# Patient Record
Sex: MALE | Race: WHITE | Employment: OTHER | URBAN - METROPOLITAN AREA
[De-identification: names, ages, dates, MRNs, and addresses within clinical notes are randomized per-mention and may not be internally consistent; named-entity substitution may affect disease eponyms.]

---

## 2017-01-01 ENCOUNTER — ALLIED HEALTH/NURSE VISIT (OUTPATIENT)
Dept: FAMILY MEDICINE | Facility: CLINIC | Age: 82
End: 2017-01-01
Payer: COMMERCIAL

## 2017-01-01 DIAGNOSIS — Z23 NEED FOR PROPHYLACTIC VACCINATION AND INOCULATION AGAINST INFLUENZA: Primary | ICD-10-CM

## 2017-01-01 PROCEDURE — 90662 IIV NO PRSV INCREASED AG IM: CPT

## 2017-01-01 PROCEDURE — 99207 ZZC NO CHARGE NURSE ONLY: CPT

## 2017-01-01 PROCEDURE — G0008 ADMIN INFLUENZA VIRUS VAC: HCPCS

## 2017-01-02 DIAGNOSIS — K57.32 DIVERTICULITIS OF COLON: Primary | ICD-10-CM

## 2017-01-02 DIAGNOSIS — I10 ESSENTIAL HYPERTENSION WITH GOAL BLOOD PRESSURE LESS THAN 140/90: ICD-10-CM

## 2017-01-02 RX ORDER — CIPROFLOXACIN 500 MG/1
500 TABLET, FILM COATED ORAL 2 TIMES DAILY
Qty: 20 TABLET | Refills: 0 | Status: CANCELLED | OUTPATIENT
Start: 2017-01-02

## 2017-01-02 RX ORDER — METOPROLOL SUCCINATE 25 MG/1
12.5 TABLET, EXTENDED RELEASE ORAL
Qty: 30 TABLET | Refills: 0 | Status: SHIPPED | OUTPATIENT
Start: 2017-01-02 | End: 2017-01-03

## 2017-01-02 NOTE — TELEPHONE ENCOUNTER
Metoprolol      Last Written Prescription Date: Historical  Last Fill Quantity: , # refills:     Last Office Visit with [facility] [place] or [facility] prescribing provider:  12/21/16   Future Office Visit:   0     BP Readings from Last 3 Encounters:   12/21/16 138/72   10/18/16 139/69   10/14/16 151/71       Coprofloxacin      Last Written Prescription Date: 10/26/16  Last Fill Quantity: 20,  # refills: 0   Last Office Visit with [facility] [place] or [facility] prescribing provider: 12/21/16

## 2017-01-03 DIAGNOSIS — I10 ESSENTIAL HYPERTENSION WITH GOAL BLOOD PRESSURE LESS THAN 140/90: Primary | ICD-10-CM

## 2017-01-03 RX ORDER — METOPROLOL SUCCINATE 25 MG/1
12.5 TABLET, EXTENDED RELEASE ORAL
Qty: 90 TABLET | Refills: 1 | Status: SHIPPED | OUTPATIENT
Start: 2017-01-03 | End: 2017-01-11

## 2017-01-03 NOTE — TELEPHONE ENCOUNTER
Anibal is wondering if his Metoprolol Rx can be changed to 90 day Rx for his mail order.  It was sent yesterday as #30

## 2017-01-05 ENCOUNTER — TELEPHONE (OUTPATIENT)
Dept: FAMILY MEDICINE | Facility: CLINIC | Age: 82
End: 2017-01-05

## 2017-01-05 NOTE — TELEPHONE ENCOUNTER
Reason for Call:  Home Health Care    Jolene with [facility] called regarding (reason for call): Jolene says she has a concern. Anibal had surgery for a neoplasm on his left lower lobe. He says he has lost 24# . He has no nausea or vomiting and is eating small meals.  His current weight reported by Anibal is 222#  She wonders if she should be concerned or if he should just continue to monitor    Pt Provider:Froilan    Phone Number Homecare Nurse can be reached at: [phone]    Can we leave a detailed message on this number? YES    Best Time: anytime    Call taken on 1/5/2017 at 11:06 AM by Sheryl Cunningham

## 2017-01-06 ENCOUNTER — ALLIED HEALTH/NURSE VISIT (OUTPATIENT)
Dept: FAMILY MEDICINE | Facility: CLINIC | Age: 82
End: 2017-01-06
Payer: COMMERCIAL

## 2017-01-06 DIAGNOSIS — C34.92 ADENOCARCINOMA, LUNG, LEFT (H): Primary | ICD-10-CM

## 2017-01-06 PROCEDURE — 99207 ZZC NO CHARGE NURSE ONLY: CPT

## 2017-01-06 NOTE — PROGRESS NOTES
S-(situation): homecare nurse stopped into clinic to discuss Anibal home status    B-(background): no nausea, vomiting, diarrhea or fever.  Weight has gone from 246 # to 222 # in the last two weeks.      A-(assessment): weight loss    R-(recommendations): advised the nurse to have him make an appointment.  He has talked with another RN this morning as well and she will call him to set up appointment with Dr Mcgovern.  Tish Medina RN

## 2017-01-06 NOTE — TELEPHONE ENCOUNTER
S-(situation): Patient has been losing weight since his surgery in December.      B-(background): Patient has history of lung adenocarcinoma with mets.  Patient had left thoracotomy with pleural biopsies and left lower lobe wedge.    A-(assessment): Patient and nurse are concerned about the patient losing weight since his recent surgery in December.  Patient reports that he has lost about 30#.  Patient denies any nausea, vomiting, diarrhea or fevers.  Patient has been eating normal with lots of protein.  Patient states he has normal bowel movements and urination.  Patient does have some SOB with going up a flight of stairs, but denies  SOB while walking or any other time.  Patient does daily weights at the same time everyday.  Patient has also developed bilateral hand shakiness for the past 2 weeks.     R-(recommendations): Advised patient to be seen in clinic.  Scheduled for 1-9-17 with PCP.  Patient was advised if symptoms worsen or change over the weekend to go to UC/[facility] immediately.  Patient agrees with the plan and understands.    Isabel GARCIA RN

## 2017-01-09 ENCOUNTER — OFFICE VISIT (OUTPATIENT)
Dept: FAMILY MEDICINE | Facility: CLINIC | Age: 82
End: 2017-01-09
Payer: COMMERCIAL

## 2017-01-09 VITALS
RESPIRATION RATE: 20 BRPM | WEIGHT: 231 LBS | SYSTOLIC BLOOD PRESSURE: 120 MMHG | TEMPERATURE: 97.2 F | DIASTOLIC BLOOD PRESSURE: 70 MMHG | HEIGHT: 69 IN | HEART RATE: 92 BPM | BODY MASS INDEX: 34.21 KG/M2

## 2017-01-09 DIAGNOSIS — R63.4 WEIGHT LOSS: Primary | ICD-10-CM

## 2017-01-09 DIAGNOSIS — L30.9 DERMATITIS: ICD-10-CM

## 2017-01-09 DIAGNOSIS — C34.92 ADENOCARCINOMA, LUNG, LEFT (H): ICD-10-CM

## 2017-01-09 PROCEDURE — 99213 OFFICE O/P EST LOW 20 MIN: CPT | Performed by: FAMILY MEDICINE

## 2017-01-09 RX ORDER — TRIAMCINOLONE ACETONIDE 1 MG/G
CREAM TOPICAL 2 TIMES DAILY
Qty: 60 G | Refills: 1 | Status: SHIPPED | OUTPATIENT
Start: 2017-01-09 | End: 2018-01-01

## 2017-01-09 NOTE — PATIENT INSTRUCTIONS
ASSESSMENT:   (R63.4) Weight loss  (primary encounter diagnosis)  Comment: Uncertain cause.  We do not know of extensive cancer spread.  No depression.  NO change in medications.   Plan: REcheck in a month after PET scan.     (L30.9) Dermatitis  Comment: legs  Plan: triamcinolone (KENALOG) 0.1 % cream  For dry skin:  Limit bathing to every other or every third day if possible.  Limit water and chemical exposure.  Use mild soap for bathing like Ivory or Neutragena, and limit areas the soap is used for example groin and armpits.    Frequent use of moisturizing lotion or cream is important. There are lotions which are thin and work in quickly like Vaseline Intensive care or Tammy lotion.  Creams are a litte thicker like Yolanda.  Some skin lubricants hold in moisture even better like Eucerin or Aquafor but these are also greasier.   Topical steroid medication can be applied to irritated areas twice daily.    1% hydrocortisone cream is mild and available over the counter.    There are stronger cortisone creams available medium and very potent preparations.     Longer tem use (weeks or months) of topical cortisone/steroid medications can cause skin atrophy or lighter skin color.  The more potent the preparation, the more risk for damaging the skin.    You can try the triamcinolone 0.1% cream twice daily for the itchy skin on legs.       (C34.92) Adenocarcinoma, lung, left (H)  Comment: followed by cancer specialist at [facility], PET scan in a month.

## 2017-01-09 NOTE — NURSING NOTE
"Chief Complaint   Patient presents with     Weight Loss     Can he have bees taken off his allergies?     Derm Problem     Check skin on ankles, itchy area since cold weather.     /70 mmHg  Pulse 92  Temp(Src) 97.2  F (36.2  C) (Tympanic)  Resp 20  Ht 5' 9\" (1.753 m)  Wt 231 lb (104.781 kg)  BMI 34.10 kg/m2 Estimated body mass index is 34.1 kg/(m^2) as calculated from the following:    Height as of this encounter: 5' 9\" (1.753 m).    Weight as of this encounter: 231 lb (104.781 kg).  bp completed using cuff size: regular      Health Maintenance that is potentially due pending provider review:          "

## 2017-01-09 NOTE — PROGRESS NOTES
SUBJECTIVE:                                                    Anibal Bhardwaj is a 84 year old male who presents to clinic today for the following health issues:  Chief Complaint   Patient presents with     Weight Loss     Can he have bees taken off his allergies?     Derm Problem     Check skin on ankles, itchy area since cold weather.      Last clinic visit: 12/21 for follow-up hospital stay for pulmonary nodules and wedge resection with biopsies.    He has seen Hematology/Oncology.  No treatment recommended so far.   He is scheduled for a PET scan in early February.  This will also be done at [facility].    No treatment offered at this time until PET scan is back.     Problem 1:   Loss of weight      Duration: one month    Description (location/character/radiation): Loss of weight.    Intensity:  moderate  Accompanying signs and symptoms:Patient has also developed bilateral hand shakiness for the past 2 weeks.     History (similar episodes/previous evaluation): Cancer    Precipitating or alleviating factors: None    Therapies tried and outcome: none   He has been losing about a pound a day.   Wt Readings from Last 5 Encounters:   01/09/17 231 lb (104.781 kg)   12/21/16 234 lb (106.142 kg)   10/18/16 244 lb 6.4 oz (110.859 kg)   10/06/16 242 lb (109.77 kg)   06/17/16 246 lb (111.585 kg)     11/9/2015:do=825  Diet:not eating as much.  Not as hungry.  Not as active.  Appetite:seems to get full easily.  No difficulty swallowing.  No heartburn.  No nausea or vomiting. Bowels unchanged.     Problem 2:   derm      Duration: months    Description (location/character/radiation): itchy areas on bilateral ankles and back of legs    Intensity:  mild, moderate, severe    Accompanying signs and symptoms: itchy skin no fever or chills    History (similar episodes/previous evaluation): ?    Precipitating or alleviating factors: ? Heat from blankets makes it worse    Therapies tried and outcome: Lotion   Has had rash each  "winter.  Gets tiny pimples which are itchy.     Patient Active Problem List   Diagnosis     Allergic rhinitis     History of colonic polyps     Osteoarthrosis, unspecified whether generalized or localized, involving lower leg     Obesity     Gouty arthropathy     Hypertrophy of prostate without urinary obstruction     HYPERLIPIDEMIA LDL GOAL <130     Seborrheic keratosis     Lentigo     Angioma     Dermal nevus     Essential hypertension with goal blood pressure less than 140/90     LBBB (left bundle branch block)     Infection of prosthetic knee joint (H)     CKD (chronic kidney disease) stage 3, GFR 30-59 ml/min     Diabetes mellitus, type 2 (H)     Adenocarcinoma, lung, left (H)      ROS:General: POSITIVE for:, weight loss, energy has decreased.  Resp: No coughing, wheezing or shortness of breath  CV: No chest pains or palpitations  GI: see above   : No urinary frequency or dysuria, bladder or kidney problems  Musculoskeletal: pain left anterior chest.   Psychiatric: No problems with anxiety, depression or mental health  Feels like band at left lower anterior ribs.     OBJECTIVE:Blood pressure 120/70, pulse 92, temperature 97.2  F (36.2  C), temperature source Tympanic, resp. rate 20, height 5' 9\" (1.753 m), weight 231 lb (104.781 kg). BMI=Body mass index is 34.1 kg/(m^2).  GENERAL APPEARANCE ADULT: Alert, no acute distress, obese  NECK: No adenopathy,masses or thyromegaly  RESP: lungs clear to auscultation   CV: normal rate, regular rhythm, no murmur or gallop  ABDOMEN: soft, no organomegaly, masses or tenderness  MS: extremities normal, no peripheral edema  Some tenderness around incision left lateral chest wall.   SKIN: Some dry skin on lower legs.      ASSESSMENT:   (R63.4) Weight loss  (primary encounter diagnosis)  Comment: Uncertain cause.  We do not know of extensive cancer spread.  No depression.  NO change in medications.   Plan: REcheck in a month after PET scan.     (L30.9) Dermatitis  Comment: " legs  Plan: triamcinolone (KENALOG) 0.1 % cream  For dry skin:  Limit bathing to every other or every third day if possible.  Limit water and chemical exposure.  Use mild soap for bathing like Ivory or Neutragena, and limit areas the soap is used for example groin and armpits.    Frequent use of moisturizing lotion or cream is important. There are lotions which are thin and work in quickly like Vaseline Intensive care or Tammy lotion.  Creams are a litte thicker like Yolanda.  Some skin lubricants hold in moisture even better like Eucerin or Aquafor but these are also greasier.   Topical steroid medication can be applied to irritated areas twice daily.    1% hydrocortisone cream is mild and available over the counter.    There are stronger cortisone creams available medium and very potent preparations.     Longer tem use (weeks or months) of topical cortisone/steroid medications can cause skin atrophy or lighter skin color.  The more potent the preparation, the more risk for damaging the skin.    You can try the triamcinolone 0.1% cream twice daily for the itchy skin on legs.       (C34.92) Adenocarcinoma, lung, left (H)  Comment: followed by cancer specialist at [facility], PET scan in a month.

## 2017-01-09 NOTE — MR AVS SNAPSHOT
After Visit Summary   1/9/2017    Anibal Bhardwaj    MRN: [id number]           Patient Information     Date Of Birth          9/18/1932        Visit Information        Provider Department      1/9/2017 2:40 PM Michele Mcgovern MD [facility]        Today's Diagnoses     Weight loss    -  1     Dermatitis         Adenocarcinoma, lung, left (H)           Care Instructions    ASSESSMENT:   (R63.4) Weight loss  (primary encounter diagnosis)  Comment: Uncertain cause.  We do not know of extensive cancer spread.  No depression.  NO change in medications.   Plan: REcheck in a month after PET scan.     (L30.9) Dermatitis  Comment: legs  Plan: triamcinolone (KENALOG) 0.1 % cream  For dry skin:  Limit bathing to every other or every third day if possible.  Limit water and chemical exposure.  Use mild soap for bathing like Ivory or Neutragena, and limit areas the soap is used for example groin and armpits.    Frequent use of moisturizing lotion or cream is important. There are lotions which are thin and work in quickly like Vaseline Intensive care or Tammy lotion.  Creams are a litte thicker like Yolanda.  Some skin lubricants hold in moisture even better like Eucerin or Aquafor but these are also greasier.   Topical steroid medication can be applied to irritated areas twice daily.    1% hydrocortisone cream is mild and available over the counter.    There are stronger cortisone creams available medium and very potent preparations.     Longer tem use (weeks or months) of topical cortisone/steroid medications can cause skin atrophy or lighter skin color.  The more potent the preparation, the more risk for damaging the skin.    You can try the triamcinolone 0.1% cream twice daily for the itchy skin on legs.       (C34.92) Adenocarcinoma, lung, left (H)  Comment: followed by cancer specialist at [facility], PET scan in a month.         Follow-ups after your visit        Who to contact     If you have  "questions or need follow up information about today's clinic visit or your schedule please contact [facility] directly at [phone].  Normal or non-critical lab and imaging results will be communicated to you by MyChart, letter or phone within 4 business days after the clinic has received the results. If you do not hear from us within 7 days, please contact the clinic through [employer] or phone. If you have a critical or abnormal lab result, we will notify you by phone as soon as possible.  Submit refill requests through [employer] or call your pharmacy and they will forward the refill request to us. Please allow 3 business days for your refill to be completed.          Additional Information About Your Visit        [email or web address] Information     [employer] lets you send messages to your doctor, view your test results, renew your prescriptions, schedule appointments and more. To sign up, go to [email or web address] . Click on \"Log in\" on the left side of the screen, which will take you to the Welcome page. Then click on \"Sign up Now\" on the right side of the page.     You will be asked to enter the access code listed below, as well as some personal information. Please follow the directions to create your username and password.     Your access code is: [email or web address]  Expires: 2017  3:59 PM     Your access code will  in 90 days. If you need help or a new code, please call your [facility] clinic or [phone].        Care EveryWhere ID     This is your Care EveryWhere ID. This could be used by other organizations to access your [facility] medical records  [id number]        Your Vitals Were     Pulse Temperature Respirations Height BMI (Body Mass Index)       92 97.2  F (36.2  C) (Tympanic) 20 5' 9\" (1.753 m) 34.10 kg/m2        Blood Pressure from Last 3 Encounters:   17 120/70   16 138/72   10/18/16 139/69    Weight from Last 3 Encounters:   17 231 lb (104.781 kg)   16 234 lb " (106.142 kg)   10/18/16 244 lb 6.4 oz (110.859 kg)              Today, you had the following     No orders found for display         Today's Medication Changes          These changes are accurate as of: 1/9/17  3:59 PM.  If you have any questions, ask your nurse or doctor.               Start taking these medicines.        Dose/Directions    triamcinolone 0.1 % cream   Commonly known as:  KENALOG   Used for:  Dermatitis   Started by:  Michele Mcgovern MD        Apply topically 2 times daily For leg rash   Quantity:  60 g   Refills:  1            Where to get your medicines      These medications were sent to [place] [id number]     Phone:  [phone]    - triamcinolone 0.1 % cream             Primary Care Provider Office Phone # Fax #    Michele Mcgovern [phone]       [address] [address]        Thank you!     Thank you for choosing [facility]  for your care. Our goal is always to provide you with excellent care. Hearing back from our patients is one way we can continue to improve our services. Please take a few minutes to complete the written survey that you may receive in the mail after your visit with us. Thank you!             Your Updated Medication List - Protect others around you: Learn how to safely use, store and throw away your medicines at www.disposemymeds.org.          This list is accurate as of: 1/9/17  3:59 PM.  Always use your most recent med list.                   Brand Name Dispense Instructions for use    acetaminophen 325 MG tablet    TYLENOL     Take 325-650 mg by mouth every 6 hours as needed for mild pain       amLODIPine 10 MG tablet    NORVASC    90 tablet    Take 1 tablet (10 mg) by mouth daily       aspirin 81 MG tablet      Take 81 mg by mouth daily       cyclobenzaprine 5 MG tablet    FLEXERIL     Take 1 tablet  (5 mg) by mouth 3 times daily as needed for muscle spasms       hydrochlorothiazide 25 MG tablet    HYDRODIURIL    90 tablet    Take 1 tablet (25 mg) by mouth daily       metoprolol 25 MG 24 hr tablet    TOPROL-XL    90 tablet    Take 0.5 tablets (12.5 mg) by mouth 2 times daily       senna-docusate 8.6-50 MG per tablet    SENOKOT-S;PERICOLACE     Take 1 tablet by mouth 2 times daily as needed for constipation       simvastatin 20 MG tablet    ZOCOR    90 tablet    Take 1 tablet (20 mg) by mouth At Bedtime       triamcinolone 0.1 % cream    KENALOG    60 g    Apply topically 2 times daily For leg rash

## 2017-01-11 DIAGNOSIS — I10 ESSENTIAL HYPERTENSION WITH GOAL BLOOD PRESSURE LESS THAN 140/90: Primary | ICD-10-CM

## 2017-01-11 DIAGNOSIS — K57.32 DIVERTICULITIS OF COLON: ICD-10-CM

## 2017-01-11 RX ORDER — CIPROFLOXACIN 500 MG/1
500 TABLET, FILM COATED ORAL 2 TIMES DAILY
Qty: 20 TABLET | Refills: 0 | Status: CANCELLED | OUTPATIENT
Start: 2017-01-11

## 2017-01-11 RX ORDER — METOPROLOL TARTRATE 25 MG/1
12.5 TABLET, FILM COATED ORAL 2 TIMES DAILY
Qty: 90 TABLET | Refills: 3 | Status: SHIPPED | OUTPATIENT
Start: 2017-01-11 | End: 2017-01-31

## 2017-01-11 RX ORDER — METOPROLOL SUCCINATE 25 MG/1
12.5 TABLET, EXTENDED RELEASE ORAL
Qty: 90 TABLET | Refills: 1 | Status: SHIPPED | OUTPATIENT
Start: 2017-01-11 | End: 2017-01-11

## 2017-01-11 NOTE — TELEPHONE ENCOUNTER
I spoke with Anibal.  He does not need cipro.    He prefers the metoprolol tartrate.  He has been taking 1/2 of the 25mg pills (12.5mg) twice daily.  I sent refill.   JOCELYNN JOHNSON MD

## 2017-01-11 NOTE — TELEPHONE ENCOUNTER
Please redo RX per Pharmacy - The Metoprolol was ordered different than patient has had in the past    Metoprolol Tartrate 25mg 90 day supply with 4 refills      Last Written Prescription Date:   Last Fill Quantity: , # refills:     Last [place] [facility] prescribing provider:  01/09/17   Future Office Visit:        BP Readings from Last 3 Encounters:   01/09/17 120/70   12/21/16 138/72   10/18/16 139/69     Cipro 500mg      Last Written Prescription Date:  10/26/16  Last Fill Quantity: 20,   # refills: 0  Last Office Visit with [facility] prescribing provider: 01/09/17  Future Office visit:       Routing refill request to provider for review/approval because:  Drug not on the [employer] [facility] or [facility] refill protocol or controlled substance

## 2017-01-31 ENCOUNTER — TELEPHONE (OUTPATIENT)
Dept: FAMILY MEDICINE | Facility: CLINIC | Age: 82
End: 2017-01-31

## 2017-01-31 DIAGNOSIS — I10 ESSENTIAL HYPERTENSION WITH GOAL BLOOD PRESSURE LESS THAN 140/90: Primary | ICD-10-CM

## 2017-01-31 RX ORDER — METOPROLOL TARTRATE 25 MG/1
12.5 TABLET, FILM COATED ORAL 2 TIMES DAILY
Qty: 90 TABLET | Refills: 3 | Status: SHIPPED | OUTPATIENT
Start: 2017-01-31 | End: 2017-06-28

## 2017-01-31 NOTE — TELEPHONE ENCOUNTER
Patient got his RX and it shatters when he splits it - He called the  of the medication and they told him to get a new RX and ask for - M[employer] brand - Its a different shape and splits better -

## 2017-02-07 ENCOUNTER — TELEPHONE (OUTPATIENT)
Dept: FAMILY MEDICINE | Facility: CLINIC | Age: 82
End: 2017-02-07
Payer: COMMERCIAL

## 2017-02-07 NOTE — Clinical Note
[facility]  [address] [address]  Phone: [phone]  Fax: [phone]      February 7, 2017      Anibal Bhardwaj  [address] [facility] [address]              To whom it may concern:    Anibal Bhardwaj is under my professional care.  Anibal is OK to have routine dental work.          Sincerely,        Michele Mcgovern MD/Tish Medina RN

## 2017-02-07 NOTE — TELEPHONE ENCOUNTER
Reason for Call:  Other     Detailed comments: Anibal has had to miss several dentist appointments due to his cancer and surgery. He has appointment tomorrow at 10:00. The dentist is asking for a letter from Dr Mcgovern that he is OK to be seen by a dentist now. The appointment is for a cleaning. He is going to call us back with a fax number to the dentist. He is asking if it could be sent right away on Wednesday morning. Anibal won't be able to pick it up because they have to leave home by 8:00 to get there.  He is aware that Dr Mcgovern is not in the office today.     Phone Number Patient can be reached at: Home number on file [phone] (home)    Best Time: anytime    Can we leave a detailed message on this number? YES    Call taken on 2/7/2017 at 1:20 PM by Sheryl Cunningham

## 2017-03-29 DIAGNOSIS — E78.5 HYPERLIPIDEMIA LDL GOAL <130: ICD-10-CM

## 2017-03-29 RX ORDER — SIMVASTATIN 20 MG
20 TABLET ORAL AT BEDTIME
Qty: 30 TABLET | Refills: 0 | Status: SHIPPED | OUTPATIENT
Start: 2017-03-29 | End: 2017-04-07

## 2017-03-29 NOTE — TELEPHONE ENCOUNTER
simvastatin      Last Written Prescription Date: 12/27/16  Last Fill Quantity: 90, # refills: 0  Last Office Visit with [facility] or [facility] prescribing provider: 1/9/17  Next 5 appointments (look out 90 days)     Jun 26, 2017  8:40 AM CDT   [facility]govern MD   [facility]    [address] [place] [id number]   [phone]                   Lab Results   Component Value Date    CHOL 167 07/29/2015     Lab Results   Component Value Date    HDL 44 07/29/2015     Lab Results   Component Value Date    LDL 96 07/29/2015     Lab Results   Component Value Date    TRIG 134 07/29/2015     Lab Results   Component Value Date    CHOLHDLRATIO 3.8 07/29/2015

## 2017-04-07 DIAGNOSIS — E78.5 HYPERLIPIDEMIA LDL GOAL <130: ICD-10-CM

## 2017-04-10 RX ORDER — SIMVASTATIN 20 MG
TABLET ORAL
Qty: 30 TABLET | Refills: 0 | Status: SHIPPED | OUTPATIENT
Start: 2017-04-10 | End: 2017-05-11

## 2017-04-10 NOTE — TELEPHONE ENCOUNTER
simvastatin (ZOCOR) 20 MG tablet     Last Written Prescription Date: 3/29/17  Last Fill Quantity: 30, # refills: 0  Last Office Visit with [facility] or [facility] prescribing provider: 6/8/17  Next 5 appointments (look out 90 days)     Jun 26, 2017  8:40 AM CDT   PHYSICAL with Michele Mcgovern MD   [facility]    [address] [place] [id number] [phone]                   Lab Results   Component Value Date    CHOL 167 07/29/2015     Lab Results   Component Value Date    HDL 44 07/29/2015     Lab Results   Component Value Date    LDL 96 07/29/2015     Lab Results   Component Value Date    TRIG 134 07/29/2015     Lab Results   Component Value Date    CHOLHDLRATIO 3.8 07/29/2015

## 2017-05-11 DIAGNOSIS — I10 ESSENTIAL HYPERTENSION WITH GOAL BLOOD PRESSURE LESS THAN 140/90: Primary | ICD-10-CM

## 2017-05-11 DIAGNOSIS — E78.5 HYPERLIPIDEMIA LDL GOAL <130: ICD-10-CM

## 2017-05-11 DIAGNOSIS — N18.30 CKD (CHRONIC KIDNEY DISEASE) STAGE 3, GFR 30-59 ML/MIN (H): ICD-10-CM

## 2017-05-11 DIAGNOSIS — E11.9 DIABETES MELLITUS, TYPE 2 (H): ICD-10-CM

## 2017-05-12 RX ORDER — SIMVASTATIN 20 MG
TABLET ORAL
Qty: 30 TABLET | Refills: 0 | Status: SHIPPED | OUTPATIENT
Start: 2017-05-12 | End: 2017-06-28

## 2017-05-12 NOTE — TELEPHONE ENCOUNTER
simvastatin (ZOCOR) 20 MG tablet     Last Written Prescription Date: 4/10/17  Last Fill Quantity: 30, # refills: 0  Last Office Visit with [facility] or [facility] prescribing provider: 1/9/17  Next 5 appointments (look out 90 days)     Jun 28, 2017  8:40 AM CDT   PHYSICAL with Michele Mcgovern MD   [facility]    [address] [place] [id number] [phone]                   Lab Results   Component Value Date    CHOL 167 07/29/2015     Lab Results   Component Value Date    HDL 44 07/29/2015     Lab Results   Component Value Date    LDL 96 07/29/2015     Lab Results   Component Value Date    TRIG 134 07/29/2015     Lab Results   Component Value Date    CHOLHDLRATIO 3.8 07/29/2015

## 2017-06-28 ENCOUNTER — OFFICE VISIT (OUTPATIENT)
Dept: FAMILY MEDICINE | Facility: CLINIC | Age: 82
End: 2017-06-28
Payer: COMMERCIAL

## 2017-06-28 VITALS
WEIGHT: 236 LBS | HEIGHT: 69 IN | BODY MASS INDEX: 34.96 KG/M2 | OXYGEN SATURATION: 97 % | HEART RATE: 62 BPM | DIASTOLIC BLOOD PRESSURE: 58 MMHG | SYSTOLIC BLOOD PRESSURE: 120 MMHG | TEMPERATURE: 97.4 F | RESPIRATION RATE: 16 BRPM

## 2017-06-28 DIAGNOSIS — I10 ESSENTIAL HYPERTENSION WITH GOAL BLOOD PRESSURE LESS THAN 140/90: ICD-10-CM

## 2017-06-28 DIAGNOSIS — N18.2 TYPE 2 DIABETES MELLITUS WITH STAGE 2 CHRONIC KIDNEY DISEASE, WITHOUT LONG-TERM CURRENT USE OF INSULIN (H): ICD-10-CM

## 2017-06-28 DIAGNOSIS — C34.92 ADENOCARCINOMA, LUNG, LEFT (H): ICD-10-CM

## 2017-06-28 DIAGNOSIS — E11.22 TYPE 2 DIABETES MELLITUS WITH STAGE 2 CHRONIC KIDNEY DISEASE, WITHOUT LONG-TERM CURRENT USE OF INSULIN (H): ICD-10-CM

## 2017-06-28 DIAGNOSIS — Z13.89 SCREENING FOR DIABETIC PERIPHERAL NEUROPATHY: ICD-10-CM

## 2017-06-28 DIAGNOSIS — R07.89 CHEST WALL PAIN: ICD-10-CM

## 2017-06-28 DIAGNOSIS — Z00.00 MEDICARE ANNUAL WELLNESS VISIT, SUBSEQUENT: Primary | ICD-10-CM

## 2017-06-28 DIAGNOSIS — M10.9 GOUTY ARTHROPATHY: ICD-10-CM

## 2017-06-28 DIAGNOSIS — E78.5 HYPERLIPIDEMIA LDL GOAL <130: ICD-10-CM

## 2017-06-28 DIAGNOSIS — N18.30 CKD (CHRONIC KIDNEY DISEASE) STAGE 3, GFR 30-59 ML/MIN (H): ICD-10-CM

## 2017-06-28 LAB
CHOLEST SERPL-MCNC: 171 MG/DL
CREAT UR-MCNC: 106 MG/DL
HBA1C MFR BLD: 6 % (ref 4.3–6)
HDLC SERPL-MCNC: 43 MG/DL
LDLC SERPL CALC-MCNC: 95 MG/DL
MICROALBUMIN UR-MCNC: 13 MG/L
MICROALBUMIN/CREAT UR: 12.45 MG/G CR (ref 0–17)
NONHDLC SERPL-MCNC: 128 MG/DL
TRIGL SERPL-MCNC: 165 MG/DL
TSH SERPL DL<=0.005 MIU/L-ACNC: 2.48 MU/L (ref 0.4–4)

## 2017-06-28 PROCEDURE — 99207 C FOOT EXAM  NO CHARGE: CPT | Mod: 25 | Performed by: FAMILY MEDICINE

## 2017-06-28 PROCEDURE — 84443 ASSAY THYROID STIM HORMONE: CPT | Performed by: FAMILY MEDICINE

## 2017-06-28 PROCEDURE — 82043 UR ALBUMIN QUANTITATIVE: CPT | Performed by: FAMILY MEDICINE

## 2017-06-28 PROCEDURE — 99397 PER PM REEVAL EST PAT 65+ YR: CPT | Performed by: FAMILY MEDICINE

## 2017-06-28 PROCEDURE — 36415 COLL VENOUS BLD VENIPUNCTURE: CPT | Performed by: FAMILY MEDICINE

## 2017-06-28 PROCEDURE — 83036 HEMOGLOBIN GLYCOSYLATED A1C: CPT | Performed by: FAMILY MEDICINE

## 2017-06-28 PROCEDURE — 80061 LIPID PANEL: CPT | Performed by: FAMILY MEDICINE

## 2017-06-28 RX ORDER — SIMVASTATIN 20 MG
TABLET ORAL
Qty: 90 TABLET | Refills: 3 | Status: SHIPPED | OUTPATIENT
Start: 2017-06-28 | End: 2018-01-01

## 2017-06-28 RX ORDER — AMLODIPINE BESYLATE 10 MG/1
10 TABLET ORAL DAILY
Qty: 90 TABLET | Refills: 3 | Status: SHIPPED | OUTPATIENT
Start: 2017-06-28 | End: 2018-01-01

## 2017-06-28 RX ORDER — METOPROLOL SUCCINATE 25 MG/1
25 TABLET, EXTENDED RELEASE ORAL DAILY
Qty: 90 TABLET | Refills: 3 | Status: SHIPPED | OUTPATIENT
Start: 2017-06-28 | End: 2018-01-01

## 2017-06-28 RX ORDER — HYDROCHLOROTHIAZIDE 25 MG/1
25 TABLET ORAL DAILY
Qty: 90 TABLET | Refills: 3 | Status: SHIPPED | OUTPATIENT
Start: 2017-06-28 | End: 2018-01-01

## 2017-06-28 NOTE — PROGRESS NOTES
Please call.  Lipids all good except triglycerides which are high.  Microalbumin urine test is normal indicating no increased amount of protein in urine. This is a measure of kidney function.   TSH is normal indicating that thyroid function is normal.   Hgb A1C suggests great control of diabetes mellitus.   PLAN: No new changes in treatment recommended.

## 2017-06-28 NOTE — PROGRESS NOTES
SUBJECTIVE:   Anibal Bhardwaj is a 84 year old male who presents for Preventive Visit.  Chief Complaint   Patient presents with     Medicare Visit      Last clinic visit: 1/9/2017.  He had been recently diagnosed with lung cancer treated at [facility].  Recent Chest CT on 4/25/2017 showed no recurrence or mets.     Blood tests that date:glucose=111, GFR=59, creatinine=1.17.  Other comprehensive normal.  HGB=13.8    Some pain left anterior lower rib margin.  Constant.  Grabbing or pulling pain.  Alleviating factors: car on a rough road.    Breathing has been good.  No dyspnea on exertion or shortness of breath.  No cough.   CV: No chest pains or palpitations   Present since his surgery.     Are you in the first 12 months of your Medicare Part B coverage?  No    Healthy Habits:    Do you get at least three servings of calcium containing foods daily (dairy, green leafy vegetables, etc.)? yes    Amount of exercise or daily activities, outside of work: acitive    Problems taking medications regularly No    Medication side effects: No    Have you had an eye exam in the past two years? yes    Do you see a dentist twice per year? yes    Do you have sleep apnea, excessive snoring or daytime drowsiness?no    COGNITIVE SCREEN  1) Repeat 3 items (Banana, Sunrise, Chair)    2) Clock draw: NORMAL  3) 3 item recall:Recalls 3 objects  Results: 3 items recalled: COGNITIVE IMPAIRMENT LESS LIKELY    Mini-CogTM Copyright S María. Licensed by the author for use in [facility]; reprinted with permission (kenya@.[facility]). All rights reserved.      Social History   Substance Use Topics     Smoking status: Never Smoker     Smokeless tobacco: Never Used     Alcohol use Yes      Comment: rarely glass of wine     Today's PHQ-2 Score:   PHQ-2 ( 1999 [employer]) 10/18/2016 8/21/2015   Q1: Little interest or pleasure in doing things 0 0   Q2: Feeling down, depressed or hopeless 0 0   PHQ-2 Score 0 0     Do you  feel safe in your environment - YES    Do you have a Health Care Directive?: yes    Current providers sharing in care for this patient include:   Patient Care Team:  Michele Mcgovern MD as PCP - General  Oncology at [facility].  He has follow-up in August.       Hearing impairment: No    Ability to successfully perform activities of daily living: Yes, no assistance needed     Fall risk:    Home safety:  discussed    The following health maintenance items are reviewed in Epic and correct as of today:  Health Maintenance   Topic Date Due     FOOT EXAM Q1 YEAR  1933     EYE EXAM Q1 YEAR  1933     TSH W/ FREE T4 REFLEX Q2 YEAR  1933     MICROALBUMIN Q1 YEAR  1933     LIPID MONITORING Q1 YEAR  2016     A1C Q6 MO  2016     INFLUENZA VACCINE (SYSTEM ASSIGNED)  2017     CREATININE Q1 YEAR  10/14/2017     FALL RISK ASSESSMENT  10/18/2017     ADVANCE DIRECTIVE PLANNING Q5 YRS  2019     COLONOSCOPY Q10 YR  2022     TETANUS IMMUNIZATION (SYSTEM ASSIGNED)  2022     PNEUMOCOCCAL  Completed     Patient Active Problem List   Diagnosis     Allergic rhinitis     History of colonic polyps     Osteoarthrosis, unspecified whether generalized or localized, involving lower leg     Obesity     Gouty arthropathy     Hypertrophy of prostate without urinary obstruction     HYPERLIPIDEMIA LDL GOAL <130     Seborrheic keratosis     Lentigo     Angioma     Dermal nevus     Essential hypertension with goal blood pressure less than 140/90     LBBB (left bundle branch block)     Infection of prosthetic knee joint (H)     CKD (chronic kidney disease) stage 3, GFR 30-59 ml/min     Diabetes mellitus, type 2 (H)     Adenocarcinoma, lung, left (H)       Multivitamin or Vit D use: no    Vaccines:current     Past Colon cancer screenin    ROS:  General: POSITIVE for:, weight loss, 10# in the past year.  Energy a little low.  Sleeps OK.   Eyes: Negative for vision changes or eye  "problems  ENT: POSITIVE for:, nasal congestion, rhinorrhea, watery eyes this time of year.   GI: No nausea, vomiting,  heartburn, abdominal pain, diarrhea, constipation or change in bowel habits  : No urinary frequency or dysuria, bladder or kidney problems  Musculoskeletal: POSITIVE  for:, pain chest wall.  NO gout spells.   Neurologic: No headaches, numbness, tingling, weakness, problems with balance or coordination  Psychiatric: No problems with anxiety, depression or mental health  Heme/immune/allergy: No history of bleeding or clotting problems or anemia.  No allergies or immune system problems  Endocrine: POSITIVE for:, diabetes mellitus, checks glucometer once in a while.   Skin: No rashes,worrisome lesions or skin problems  BP OK at home.     OBJECTIVE:                                                    OBJECTIVE:Blood pressure 120/58, pulse 62, temperature 97.4  F (36.3  C), temperature source Tympanic, resp. rate 16, height 5' 9\" (1.753 m), weight 236 lb (107 kg), SpO2 97 %. BMI=Body mass index is 34.85 kg/(m^2).  GENERAL APPEARANCE ADULT: Alert, no acute distress, obese  EYES: PERRL, EOM normal, conjunctiva and lids normal  HENT: Ears and TMs normal, oral mucosa and posterior oropharynx normal  NECK: No adenopathy,masses or thyromegaly  RESP: lungs clear to auscultation   CV: normal rate, regular rhythm, no murmur or gallop  ABDOMEN: soft, no organomegaly, masses or tenderness  MS: extremities normal, no peripheral edema  HE has tenderness along anterior left rib margin which reproduces his pain.     ASSESSMENT/PLAN:                                                    Lifestyle recommendations:regular exercise, at least 150 minutes per week (average 30 minutes 5 times a week)  keep working on losing weight (ideal BMI-body mass index is <25)  The following exams/tests were recommended and discussed for health maintenance:  When to stop colon cancer screening?  Experts agree that colon cancer screening is " generally not recommended when life expectancy is <10 years and not at age over 85.  The Expert group USPSTF recommends against screening for ages 76 and older.  It is reasonable to stop screening for colon cancer sometime between age 76 and 85 for most individuals.   Prostate cancer screening is not recommended for older men, those age 70 and older or with anticipated lifespan <10 years.  The expert group USPSTF rcommmends against any PSA prostate cancer screening.      (Z00.00) Medicare annual wellness visit, subsequent  (primary encounter diagnosis)    (Z13.89) Screening for diabetic peripheral neuropathy  Comment:   Plan: FOOT EXAM  NO CHARGE [13267.114], EYE EXAM         (SIMPLE-NONBILLABLE)          (E78.5) Hyperlipidemia LDL goal <130  Comment:   Plan: simvastatin (ZOCOR) 20 MG tablet        REfills.  Fasting blood tests today.     (I10) Essential hypertension with goal blood pressure less than 140/90  Comment: doing well  Plan: amLODIPine (NORVASC) 10 MG tablet,         hydrochlorothiazide (HYDRODIURIL) 25 MG tablet,        metoprolol (TOPROL-XL) 25 MG 24 hr tablet        Change metoprolol to long acting once daily 25mg pills.   Refills.     (C34.92) Adenocarcinoma, lung, left (H)  Comment: doing well so far.   Plan: follow-up in August as planned.     (M10.9) Gouty arthropathy  Comment: no recent episodes.     (N18.3) CKD (chronic kidney disease) stage 3, GFR 30-59 ml/min  Comment:   Plan: follow-up urine and blood tests today.     (E11.22,  N18.2) Type 2 diabetes mellitus with stage 2 chronic kidney disease, without long-term current use of insulin (H)  Comment: has been good  Plan: HEMOGLOBIN A1C, Lipid panel reflex to direct         LDL, Albumin Random Urine Quantitative, TSH         WITH FREE T4 REFLEX          (R07.89) Chest wall pain  Comment: I think the chest pain is related to surgery and injury to muscles an ligaments.  Plan: Let your cancer doctor know about pain if still present in August.     "    End of Life Planning:  Patient currently has an advanced directive: Yes.  Practitioner is supportive of decision.    COUNSELING:        Estimated body mass index is 34.11 kg/(m^2) as calculated from the following:    Height as of 1/9/17: 5' 9\" (1.753 m).    Weight as of 1/9/17: 231 lb (104.8 kg).  Weight management plan: Discussed healthy diet and exercise guidelines and patient will follow up in 12 months in clinic to re-evaluate.   reports that he has never smoked. He has never used smokeless tobacco.      Appropriate preventive services were discussed with this patient, including applicable screening as appropriate for cardiovascular disease, diabetes, osteopenia/osteoporosis, and glaucoma.  As appropriate for age/gender, discussed screening for colorectal cancer, prostate cancer, breast cancer, and cervical cancer. Checklist reviewing preventive services available has been given to the patient.    Reviewed patients plan of care and provided an AVS. The Basic Care Plan (routine screening as documented in Health Maintenance) for Anibal meets the Care Plan requirement. This Care Plan has been established and reviewed with the Patient.    Counseling Resources:  ATP IV Guidelines  Pooled Cohorts Equation Calculator  Breast Cancer Risk Calculator  FRAX Risk Assessment  ICSI Preventive Guidelines  Dietary Guidelines for Americans, 2010  USDA's MyPlate  ASA Prophylaxis  Lung CA Screening    Michele Mcgovern MD  [facility]  "

## 2017-06-28 NOTE — PATIENT INSTRUCTIONS
Preventive Health Recommendations:       Male Ages 65 and over    Yearly exam:             See your health care provider every year in order to  o   Review health changes.   o   Discuss preventive care.    o   Review your medicines if your doctor has prescribed any.    Talk with your health care provider about whether you should have a test to screen for prostate cancer (PSA).    Every 3 years, have a diabetes test (fasting glucose). If you are at risk for diabetes, you should have this test more often.    Every 5 years, have a cholesterol test. Have this test more often if you are at risk for high cholesterol or heart disease.     Every 10 years, have a colonoscopy. Or, have a yearly FIT test (stool test). These exams will check for colon cancer.    Talk to with your health care provider about screening for Abdominal Aortic Aneurysm if you have a family history of AAA or have a history of smoking.  Shots:     Get a flu shot each year.     Get a tetanus shot every 10 years.     Talk to your doctor about your pneumonia vaccines. There are now two you should receive - Pneumovax (PPSV 23) and Prevnar (PCV 13).    Talk to your doctor about a shingles vaccine.     Talk to your doctor about the hepatitis B vaccine.  Nutrition:     Eat at least 5 servings of fruits and vegetables each day.     Eat whole-grain bread, whole-wheat pasta and brown rice instead of white grains and rice.     Talk to your doctor about Calcium and Vitamin D.   Lifestyle    Exercise for at least 150 minutes a week (30 minutes a day, 5 days a week). This will help you control your weight and prevent disease.     Limit alcohol to one drink per day.     No smoking.     Wear sunscreen to prevent skin cancer.     See your dentist every six months for an exam and cleaning.     See your eye doctor every 1 to 2 years to screen for conditions such as glaucoma, macular degeneration and cataracts.    ASSESSMENT/PLAN:                                                     Lifestyle recommendations:regular exercise, at least 150 minutes per week (average 30 minutes 5 times a week)  keep working on losing weight (ideal BMI-body mass index is <25)  The following exams/tests were recommended and discussed for health maintenance:  When to stop colon cancer screening?  Experts agree that colon cancer screening is generally not recommended when life expectancy is <10 years and not at age over 85.  The Expert group USPSTF recommends against screening for ages 76 and older.  It is reasonable to stop screening for colon cancer sometime between age 76 and 85 for most individuals.   Prostate cancer screening is not recommended for older men, those age 70 and older or with anticipated lifespan <10 years.  The expert group USPSTF rcommmends against any PSA prostate cancer screening.      (Z00.00) Medicare annual wellness visit, subsequent  (primary encounter diagnosis)    (Z13.89) Screening for diabetic peripheral neuropathy  Comment:   Plan: FOOT EXAM  NO CHARGE [29122.114], EYE EXAM         (SIMPLE-NONBILLABLE)          (E78.5) Hyperlipidemia LDL goal <130  Comment:   Plan: simvastatin (ZOCOR) 20 MG tablet        REfills.  Fasting blood tests today.     (I10) Essential hypertension with goal blood pressure less than 140/90  Comment: doing well  Plan: amLODIPine (NORVASC) 10 MG tablet,         hydrochlorothiazide (HYDRODIURIL) 25 MG tablet,        metoprolol (TOPROL-XL) 25 MG 24 hr tablet        Change metoprolol to long acting once daily 25mg pills.   Refills.     (C34.92) Adenocarcinoma, lung, left (H)  Comment: doing well so far.   Plan: follow-up in August as planned.     (M10.9) Gouty arthropathy  Comment: no recent episodes.     (N18.3) CKD (chronic kidney disease) stage 3, GFR 30-59 ml/min  Comment:   Plan: follow-up urine and blood tests today.     (E11.22,  N18.2) Type 2 diabetes mellitus with stage 2 chronic kidney disease, without long-term current use of insulin  (H)  Comment: has been good  Plan: HEMOGLOBIN A1C, Lipid panel reflex to direct         LDL, Albumin Random Urine Quantitative, TSH         WITH FREE T4 REFLEX          (R07.89) Chest wall pain  Comment: I think the chest pain is related to surgery and injury to muscles an ligaments.  Plan: Let your cancer doctor know about pain if still present in August.

## 2017-06-28 NOTE — NURSING NOTE
"Chief Complaint   Patient presents with     Medicare Visit       Initial /58  Pulse 62  Temp 97.4  F (36.3  C) (Tympanic)  Resp 16  Ht 5' 9\" (1.753 m)  Wt 236 lb (107 kg)  BMI 34.85 kg/m2 Estimated body mass index is 34.85 kg/(m^2) as calculated from the following:    Height as of this encounter: 5' 9\" (1.753 m).    Weight as of this encounter: 236 lb (107 kg).  Medication Reconciliation: complete    Health Maintenance that is potentially due pending provider review:        "

## 2017-06-28 NOTE — MR AVS SNAPSHOT
After Visit Summary   6/28/2017    Anibal Bhardwaj    MRN: [id number]           Patient Information     Date Of Birth          9/18/1932        Visit Information        Provider Department      6/28/2017 8:40 AM Michele Mcgovern MD [facility]        Today's Diagnoses     Medicare annual wellness visit, subsequent    -  1    Screening for diabetic peripheral neuropathy        Hyperlipidemia LDL goal <130        Essential hypertension with goal blood pressure less than 140/90        Adenocarcinoma, lung, left (H)        Gouty arthropathy        CKD (chronic kidney disease) stage 3, GFR 30-59 ml/min        Type 2 diabetes mellitus with stage 2 chronic kidney disease, without long-term current use of insulin (H)        Chest wall pain          Care Instructions      Preventive Health Recommendations:       Male Ages 65 and over    Yearly exam:             See your health care provider every year in order to  o   Review health changes.   o   Discuss preventive care.    o   Review your medicines if your doctor has prescribed any.    Talk with your health care provider about whether you should have a test to screen for prostate cancer (PSA).    Every 3 years, have a diabetes test (fasting glucose). If you are at risk for diabetes, you should have this test more often.    Every 5 years, have a cholesterol test. Have this test more often if you are at risk for high cholesterol or heart disease.     Every 10 years, have a colonoscopy. Or, have a yearly FIT test (stool test). These exams will check for colon cancer.    Talk to with your health care provider about screening for Abdominal Aortic Aneurysm if you have a family history of AAA or have a history of smoking.  Shots:     Get a flu shot each year.     Get a tetanus shot every 10 years.     Talk to your doctor about your pneumonia vaccines. There are now two you should receive - Pneumovax (PPSV 23) and Prevnar (PCV 13).    Talk to your  doctor about a shingles vaccine.     Talk to your doctor about the hepatitis B vaccine.  Nutrition:     Eat at least 5 servings of fruits and vegetables each day.     Eat whole-grain bread, whole-wheat pasta and brown rice instead of white grains and rice.     Talk to your doctor about Calcium and Vitamin D.   Lifestyle    Exercise for at least 150 minutes a week (30 minutes a day, 5 days a week). This will help you control your weight and prevent disease.     Limit alcohol to one drink per day.     No smoking.     Wear sunscreen to prevent skin cancer.     See your dentist every six months for an exam and cleaning.     See your eye doctor every 1 to 2 years to screen for conditions such as glaucoma, macular degeneration and cataracts.    ASSESSMENT/PLAN:                                                    Lifestyle recommendations:regular exercise, at least 150 minutes per week (average 30 minutes 5 times a week)  keep working on losing weight (ideal BMI-body mass index is <25)  The following exams/tests were recommended and discussed for health maintenance:  When to stop colon cancer screening?  Experts agree that colon cancer screening is generally not recommended when life expectancy is <10 years and not at age over 85.  The Expert group USPSTF recommends against screening for ages 76 and older.  It is reasonable to stop screening for colon cancer sometime between age 76 and 85 for most individuals.   Prostate cancer screening is not recommended for older men, those age 70 and older or with anticipated lifespan <10 years.  The expert group USPSTF rcommmends against any PSA prostate cancer screening.      (Z00.00) Medicare annual wellness visit, subsequent  (primary encounter diagnosis)    (Z13.89) Screening for diabetic peripheral neuropathy  Comment:   Plan: FOOT EXAM  NO CHARGE [51199.114], EYE EXAM         (SIMPLE-NONBILLABLE)          (E78.5) Hyperlipidemia LDL goal <130  Comment:   Plan: simvastatin (ZOCOR)  20 MG tablet        REfills.  Fasting blood tests today.     (I10) Essential hypertension with goal blood pressure less than 140/90  Comment: doing well  Plan: amLODIPine (NORVASC) 10 MG tablet,         hydrochlorothiazide (HYDRODIURIL) 25 MG tablet,        metoprolol (TOPROL-XL) 25 MG 24 hr tablet        Change metoprolol to long acting once daily 25mg pills.   Refills.     (C34.92) Adenocarcinoma, lung, left (H)  Comment: doing well so far.   Plan: follow-up in August as planned.     (M10.9) Gouty arthropathy  Comment: no recent episodes.     (N18.3) CKD (chronic kidney disease) stage 3, GFR 30-59 ml/min  Comment:   Plan: follow-up urine and blood tests today.     (E11.22,  N18.2) Type 2 diabetes mellitus with stage 2 chronic kidney disease, without long-term current use of insulin (H)  Comment: has been good  Plan: HEMOGLOBIN A1C, Lipid panel reflex to direct         LDL, Albumin Random Urine Quantitative, TSH         WITH FREE T4 REFLEX          (R07.89) Chest wall pain  Comment: I think the chest pain is related to surgery and injury to muscles an ligaments.  Plan: Let your cancer doctor know about pain if still present in August.           Follow-ups after your visit        Who to contact     If you have questions or need follow up information about today's clinic visit or your schedule please contact [facility] directly at [phone].  Normal or non-critical lab and imaging results will be communicated to you by MyChart, letter or phone within 4 business days after the clinic has received the results. If you do not hear from us within 7 days, please contact the clinic through MyChart or phone. If you have a critical or abnormal lab result, we will notify you by phone as soon as possible.  Submit refill requests through Zopa or call your pharmacy and they will forward the refill request to us. Please allow 3 business days for your refill to be completed.          Additional Information  "About Your Visit        Weemb[employer]ar[employer] Information     Intrakr lets you send messages to your doctor, view your test results, renew your prescriptions, schedule appointments and more. To sign up, go to [email or web address] . Click on \"Log in\" on the left side of the screen, which will take you to the Welcome page. Then click on \"Sign up Now\" on the right side of the page.     You will be asked to enter the access code listed below, as well as some personal information. Please follow the directions to create your username and password.     Your access code is: [email or web address]  Expires: 2017  9:42 AM     Your access code will  in 90 days. If you need help or a new code, please call your [place] clinic or [phone].        Care EveryWhere ID     This is your Care EveryWhere ID. This could be used by other organizations to access your [place] medical records  [id number]        Your Vitals Were     Pulse Temperature Respirations Height Pulse Oximetry BMI (Body Mass Index)    62 97.4  F (36.3  C) (Tympanic) 16 5' 9\" (1.753 m) 97% 34.85 kg/m2       Blood Pressure from Last 3 Encounters:   17 120/58   17 120/70   16 138/72    Weight from Last 3 Encounters:   17 236 lb (107 kg)   17 231 lb (104.8 kg)   16 234 lb (106.1 kg)              We Performed the Following     Albumin Random Urine Quantitative     EYE EXAM (SIMPLE-NONBILLABLE)     FOOT EXAM  NO CHARGE [49373.114]     HEMOGLOBIN A1C     Lipid panel reflex to direct LDL     TSH WITH FREE T4 REFLEX          Today's Medication Changes          These changes are accurate as of: 17  9:42 AM.  If you have any questions, ask your nurse or doctor.               Start taking these medicines.        Dose/Directions    metoprolol 25 MG 24 hr tablet   Commonly known as:  TOPROL-XL   Used for:  Essential hypertension with goal blood pressure less than 140/90   Replaces:  metoprolol 25 MG tablet   Started by:  Michele Mcgovern MD "        Dose:  25 mg   Take 1 tablet (25 mg) by mouth daily   Quantity:  90 tablet   Refills:  3         These medicines have changed or have updated prescriptions.        Dose/Directions    simvastatin 20 MG tablet   Commonly known as:  ZOCOR   This may have changed:  See the new instructions.   Used for:  Hyperlipidemia LDL goal <130   Changed by:  Michele Mcgovern MD        TAKE 1 TABLET AT BEDTIME   Quantity:  90 tablet   Refills:  3         Stop taking these medicines if you haven't already. Please contact your care team if you have questions.     metoprolol 25 MG tablet   Commonly known as:  LOPRESSOR   Replaced by:  metoprolol 25 MG 24 hr tablet   Stopped by:  Michele Mcgovern MD                Where to get your medicines      These medications were sent to [facility] [address] [address]     Phone:  [phone]     amLODIPine 10 MG tablet    hydrochlorothiazide 25 MG tablet    metoprolol 25 MG 24 hr tablet    simvastatin 20 MG tablet                Primary Care Provider Office Phone # Fax #    Michele Mcgovern [phone]       [place] [id number] [place] [address]        Equal Access to Services     [facility]: Hadii aad ku hadasho Soomaali, waaxda luqadaha, qaybta kaalmada adeegyada, giselle mcghee hayaditya elkins . So [facility] [phone].    ATENCIÓN: Si habla español, tiene a kaye disposición servicios gratuitos de asistencia lingüística. [facility] [phone].    We comply with applicable federal civil rights laws and [place] laws. We do not discriminate on the basis of race, color, national origin, age, disability sex, sexual orientation or gender identity.            Thank you!     Thank you for choosing [facility]  for your care. Our goal is always to provide you with excellent care. Hearing back from our patients is one way we can continue to  improve our services. Please take a few minutes to complete the written survey that you may receive in the mail after your visit with us. Thank you!             Your Updated Medication List - Protect others around you: Learn how to safely use, store and throw away your medicines at www.disposemymeds.org.          This list is accurate as of: 6/28/17  9:42 AM.  Always use your most recent med list.                   Brand Name Dispense Instructions for use Diagnosis    acetaminophen 325 MG tablet    TYLENOL     Take 325-650 mg by mouth every 6 hours as needed for mild pain        amLODIPine 10 MG tablet    NORVASC    90 tablet    Take 1 tablet (10 mg) by mouth daily    Essential hypertension with goal blood pressure less than 140/90       aspirin 81 MG tablet      Take 81 mg by mouth daily        hydrochlorothiazide 25 MG tablet    HYDRODIURIL    90 tablet    Take 1 tablet (25 mg) by mouth daily    Essential hypertension with goal blood pressure less than 140/90       metoprolol 25 MG 24 hr tablet    TOPROL-XL    90 tablet    Take 1 tablet (25 mg) by mouth daily    Essential hypertension with goal blood pressure less than 140/90       simvastatin 20 MG tablet    ZOCOR    90 tablet    TAKE 1 TABLET AT BEDTIME    Hyperlipidemia LDL goal <130       triamcinolone 0.1 % cream    KENALOG    60 g    Apply topically 2 times daily For leg rash    Dermatitis

## 2017-07-24 ENCOUNTER — ALLIED HEALTH/NURSE VISIT (OUTPATIENT)
Dept: FAMILY MEDICINE | Facility: CLINIC | Age: 82
End: 2017-07-24
Payer: COMMERCIAL

## 2017-07-24 VITALS — RESPIRATION RATE: 18 BRPM | SYSTOLIC BLOOD PRESSURE: 138 MMHG | DIASTOLIC BLOOD PRESSURE: 54 MMHG | HEART RATE: 64 BPM

## 2017-07-24 DIAGNOSIS — Z01.30 BP CHECK: Primary | ICD-10-CM

## 2017-07-24 PROCEDURE — 99207 ZZC NO CHARGE NURSE ONLY: CPT

## 2017-07-24 NOTE — MR AVS SNAPSHOT
"              After Visit Summary   2017    Anibal Bhardwaj    MRN: [id number]           Patient Information     Date Of Birth          1932        Visit Information        Provider Department      2017 10:15 AM FL NB RN [facility]        Today's Diagnoses     BP check    -  1       Follow-ups after your visit        Who to contact     If you have questions or need follow up information about today's clinic visit or your schedule please contact [facility] directly at [phone].  Normal or non-critical lab and imaging results will be communicated to you by MyChart, letter or phone within 4 business days after the clinic has received the results. If you do not hear from us within 7 days, please contact the clinic through Piece[employer] or phone. If you have a critical or abnormal lab result, we will notify you by phone as soon as possible.  Submit refill requests through Liquid5 or call your pharmacy and they will forward the refill request to us. Please allow 3 business days for your refill to be completed.          Additional Information About Your Visit        MyChart Information     Liquid5 lets you send messages to your doctor, view your test results, renew your prescriptions, schedule appointments and more. To sign up, go to [email or web address] . Click on \"Log in\" on the left side of the screen, which will take you to the Welcome page. Then click on \"Sign up Now\" on the right side of the page.     You will be asked to enter the access code listed below, as well as some personal information. Please follow the directions to create your username and password.     Your access code is: [email or web address]  Expires: 2017  9:42 AM     Your access code will  in 90 days. If you need help or a new code, please call your [facility] or [phone].        Care EveryWhere ID     This is your Care EveryWhere ID. This could be used by other organizations to access " your [place] medical records  [id number]        Your Vitals Were     Pulse Respirations                64 18           Blood Pressure from Last 3 Encounters:   07/24/17 138/54   06/28/17 120/58   01/09/17 120/70    Weight from Last 3 Encounters:   06/28/17 236 lb (107 kg)   01/09/17 231 lb (104.8 kg)   12/21/16 234 lb (106.1 kg)              Today, you had the following     No orders found for display       Primary Care Provider Office Phone # Fax #    Michele Mcgovern [phone]       [facility] [address] [facility] [address]        Equal Access to Services     [facility]LLIE ESTEVES : Hadii carmenza Beal, waaxda liam, qaybta kaalmada alysia, giselle arreaga. So [facility] [phone].    ATENCIÓN: Si habla español, tiene a kaye disposición servicios gratuitos de asistencia lingüística. Llame al [phone].    We comply with applicable federal civil rights laws and [place] laws. We do not discriminate on the basis of race, color, national origin, age, disability sex, sexual orientation or gender identity.            Thank you!     Thank you for choosing [facility]  for your care. Our goal is always to provide you with excellent care. Hearing back from our patients is one way we can continue to improve our services. Please take a few minutes to complete the written survey that you may receive in the mail after your visit with us. Thank you!             Your Updated Medication List - Protect others around you: Learn how to safely use, store and throw away your medicines at www.disposemymeds.org.          This list is accurate as of: 7/24/17 10:28 AM.  Always use your most recent med list.                   Brand Name Dispense Instructions for use Diagnosis    acetaminophen 325 MG tablet    TYLENOL     Take 325-650 mg by mouth every 6 hours as needed for mild pain        amLODIPine 10 MG tablet    NORVASC    90 tablet    Take 1  tablet (10 mg) by mouth daily    Essential hypertension with goal blood pressure less than 140/90       aspirin 81 MG tablet      Take 81 mg by mouth daily        hydrochlorothiazide 25 MG tablet    HYDRODIURIL    90 tablet    Take 1 tablet (25 mg) by mouth daily    Essential hypertension with goal blood pressure less than 140/90       metoprolol 25 MG 24 hr tablet    TOPROL-XL    90 tablet    Take 1 tablet (25 mg) by mouth daily    Essential hypertension with goal blood pressure less than 140/90       simvastatin 20 MG tablet    ZOCOR    90 tablet    TAKE 1 TABLET AT BEDTIME    Hyperlipidemia LDL goal <130       triamcinolone 0.1 % cream    KENALOG    60 g    Apply topically 2 times daily For leg rash    Dermatitis

## 2017-07-24 NOTE — PROGRESS NOTES
Anibal Bhardwaj is a 84 year old male who comes in today for a Blood Pressure check because of ongoing blood pressure monitoring.    *Document pulse and BP  *Use new set of vitals button for multiple readings.  *Use extended vitals for orthostatic    Vitals as recorded, a large cuff was used.    Patient is taking medication as prescribed  Patient is tolerating medications well.  Patient is monitoring Blood Pressure at home.  Average readings if yes are :130/55    Current complaints: none    Disposition: patient to continue with the same medication  Tish Medina RN

## 2017-09-18 NOTE — PROGRESS NOTES
Injectable Influenza Immunization Documentation    1.  Is the person to be vaccinated sick today? no  2. Does the person to be vaccinated have an allergy to a component no  of the vaccine? no    3. Has the person to be vaccinated ever had a serious reaction   to influenza vaccine in the past? no    4. Has the person to be vaccinated ever had Guillain-Barré syndrome?     [facility]

## 2017-09-18 NOTE — MR AVS SNAPSHOT
"              After Visit Summary   2017    Anibal Bhardwaj    MRN: [id number]           Patient Information     Date Of Birth          1932        Visit Information        Provider Department      2017 10:00 AM FL MELVIN VILLAGOMEZ/LPN [facility]        Today's Diagnoses     Need for prophylactic vaccination and inoculation against influenza    -  1       Follow-ups after your visit        Who to contact     If you have questions or need follow up information about today's clinic visit or your schedule please contact [facility] directly at [phone].  Normal or non-critical lab and imaging results will be communicated to you by RoosterBihart, letter or phone within 4 business days after the clinic has received the results. If you do not hear from us within 7 days, please contact the clinic through [employer] or phone. If you have a critical or abnormal lab result, we will notify you by phone as soon as possible.  Submit refill requests through Funifi or call your pharmacy and they will forward the refill request to us. Please allow 3 business days for your refill to be completed.          Additional Information About Your Visit        MyChart Information     Funifi lets you send messages to your doctor, view your test results, renew your prescriptions, schedule appointments and more. To sign up, go to [email or web address] . Click on \"Log in\" on the left side of the screen, which will take you to the Welcome page. Then click on \"Sign up Now\" on the right side of the page.     You will be asked to enter the access code listed below, as well as some personal information. Please follow the directions to create your username and password.     Your access code is: [email or web address]  Expires: 2017  9:42 AM     Your access code will  in 90 days. If you need help or a new code, please call your [facility] or [phone].        Care EveryWhere ID     This is your Care " EveryWhere ID. This could be used by other organizations to access your [place] medical records  [id number]         Blood Pressure from Last 3 Encounters:   07/24/17 138/54   06/28/17 120/58   01/09/17 120/70    Weight from Last 3 Encounters:   06/28/17 236 lb (107 kg)   01/09/17 231 lb (104.8 kg)   12/21/16 234 lb (106.1 kg)              We Performed the Following     ADMIN INFLUENZA (For MEDICARE Patients ONLY) []     FLU VACCINE, INCREASED ANTIGEN, PRESV FREE, AGE 65+ [21246]        Primary Care Provider Office Phone # Fax #    Michele Mcgovern [phone] [address] [place] [address]        Equal Access to Services     NELLIE ESTEVES : Maggi donahueo Sojoe, waaxda luqadaha, qaybta kaalmada adeegyada, giselle elkins . So [facility] [phone].    ATENCIÓN: Si habla español, tiene a kaye disposición servicios gratuitos de asistencia lingüística. Llame al [phone].    We comply with applicable federal civil rights laws and [place] laws. We do not discriminate on the basis of race, color, national origin, age, disability sex, sexual orientation or gender identity.            Thank you!     Thank you for choosing [facility]  for your care. Our goal is always to provide you with excellent care. Hearing back from our patients is one way we can continue to improve our services. Please take a few minutes to complete the written survey that you may receive in the mail after your visit with us. Thank you!             Your Updated Medication List - Protect others around you: Learn how to safely use, store and throw away your medicines at www.disposemymeds.org.          This list is accurate as of: 9/18/17 10:25 AM.  Always use your most recent med list.                   Brand Name Dispense Instructions for use Diagnosis    acetaminophen 325 MG tablet    TYLENOL     Take 325-650 mg by mouth every 6 hours as needed for mild pain         amLODIPine 10 MG tablet    NORVASC    90 tablet    Take 1 tablet (10 mg) by mouth daily    Essential hypertension with goal blood pressure less than 140/90       aspirin 81 MG tablet      Take 81 mg by mouth daily        hydrochlorothiazide 25 MG tablet    HYDRODIURIL    90 tablet    Take 1 tablet (25 mg) by mouth daily    Essential hypertension with goal blood pressure less than 140/90       metoprolol 25 MG 24 hr tablet    TOPROL-XL    90 tablet    Take 1 tablet (25 mg) by mouth daily    Essential hypertension with goal blood pressure less than 140/90       simvastatin 20 MG tablet    ZOCOR    90 tablet    TAKE 1 TABLET AT BEDTIME    Hyperlipidemia LDL goal <130       triamcinolone 0.1 % cream    KENALOG    60 g    Apply topically 2 times daily For leg rash    Dermatitis

## 2017-09-18 NOTE — NURSING NOTE
Chief Complaint   Patient presents with     Flu Shot     Prior to injection verified patient identity using patient's name and date of birth.  Per orders of  , injection of high dose flu given by Tish Matos. Patient instructed to remain in clinic for 15 minutes afterwards, and to report any adverse reaction to me immediately.    Tish Matos,CMA

## 2018-01-01 ENCOUNTER — HOSPITAL ENCOUNTER (OUTPATIENT)
Dept: PHYSICAL THERAPY | Facility: CLINIC | Age: 83
Setting detail: THERAPIES SERIES
End: 2018-06-22
Attending: ORTHOPAEDIC SURGERY
Payer: COMMERCIAL

## 2018-01-01 ENCOUNTER — HOSPITAL ENCOUNTER (OUTPATIENT)
Dept: PHYSICAL THERAPY | Facility: CLINIC | Age: 83
Setting detail: THERAPIES SERIES
End: 2018-05-23
Attending: ORTHOPAEDIC SURGERY
Payer: COMMERCIAL

## 2018-01-01 ENCOUNTER — APPOINTMENT (OUTPATIENT)
Dept: RADIATION THERAPY | Facility: OUTPATIENT CENTER | Age: 83
End: 2018-01-01
Payer: COMMERCIAL

## 2018-01-01 ENCOUNTER — HOSPITAL ENCOUNTER (OUTPATIENT)
Dept: PHYSICAL THERAPY | Facility: CLINIC | Age: 83
Setting detail: THERAPIES SERIES
End: 2018-06-13
Attending: ORTHOPAEDIC SURGERY
Payer: COMMERCIAL

## 2018-01-01 ENCOUNTER — OFFICE VISIT (OUTPATIENT)
Dept: RADIATION THERAPY | Facility: OUTPATIENT CENTER | Age: 83
End: 2018-01-01
Payer: COMMERCIAL

## 2018-01-01 ENCOUNTER — HOSPITAL ENCOUNTER (OUTPATIENT)
Dept: PHYSICAL THERAPY | Facility: CLINIC | Age: 83
Setting detail: THERAPIES SERIES
End: 2018-06-04
Attending: ORTHOPAEDIC SURGERY
Payer: COMMERCIAL

## 2018-01-01 ENCOUNTER — DISCHARGE SUMMARY NURSING HOME (OUTPATIENT)
Dept: GERIATRICS | Facility: CLINIC | Age: 83
End: 2018-01-01
Payer: COMMERCIAL

## 2018-01-01 ENCOUNTER — TRANSFERRED RECORDS (OUTPATIENT)
Dept: HEALTH INFORMATION MANAGEMENT | Facility: CLINIC | Age: 83
End: 2018-01-01

## 2018-01-01 ENCOUNTER — HOSPITAL ENCOUNTER (OUTPATIENT)
Dept: PHYSICAL THERAPY | Facility: CLINIC | Age: 83
Setting detail: THERAPIES SERIES
End: 2018-05-14
Attending: ORTHOPAEDIC SURGERY
Payer: COMMERCIAL

## 2018-01-01 ENCOUNTER — NURSING HOME VISIT (OUTPATIENT)
Dept: GERIATRICS | Facility: CLINIC | Age: 83
End: 2018-01-01
Payer: COMMERCIAL

## 2018-01-01 VITALS
DIASTOLIC BLOOD PRESSURE: 66 MMHG | SYSTOLIC BLOOD PRESSURE: 131 MMHG | WEIGHT: 232 LBS | HEART RATE: 59 BPM | BODY MASS INDEX: 34.26 KG/M2

## 2018-01-01 VITALS
OXYGEN SATURATION: 91 % | SYSTOLIC BLOOD PRESSURE: 159 MMHG | DIASTOLIC BLOOD PRESSURE: 77 MMHG | TEMPERATURE: 98.5 F | HEART RATE: 79 BPM | RESPIRATION RATE: 18 BRPM

## 2018-01-01 VITALS
RESPIRATION RATE: 18 BRPM | WEIGHT: 231.2 LBS | SYSTOLIC BLOOD PRESSURE: 148 MMHG | BODY MASS INDEX: 34.14 KG/M2 | DIASTOLIC BLOOD PRESSURE: 74 MMHG | HEART RATE: 73 BPM | OXYGEN SATURATION: 96 %

## 2018-01-01 VITALS
HEIGHT: 69 IN | RESPIRATION RATE: 16 BRPM | DIASTOLIC BLOOD PRESSURE: 66 MMHG | SYSTOLIC BLOOD PRESSURE: 131 MMHG | BODY MASS INDEX: 34.1 KG/M2 | OXYGEN SATURATION: 95 % | WEIGHT: 230.2 LBS | HEART RATE: 76 BPM | TEMPERATURE: 98.5 F

## 2018-01-01 VITALS
BODY MASS INDEX: 33.23 KG/M2 | WEIGHT: 225 LBS | DIASTOLIC BLOOD PRESSURE: 80 MMHG | SYSTOLIC BLOOD PRESSURE: 151 MMHG | HEART RATE: 70 BPM | OXYGEN SATURATION: 96 % | RESPIRATION RATE: 18 BRPM

## 2018-01-01 DIAGNOSIS — Z71.89 ADVANCED DIRECTIVES, COUNSELING/DISCUSSION: ICD-10-CM

## 2018-01-01 DIAGNOSIS — I10 ESSENTIAL HYPERTENSION WITH GOAL BLOOD PRESSURE LESS THAN 140/90: ICD-10-CM

## 2018-01-01 DIAGNOSIS — J90 PLEURAL EFFUSION: ICD-10-CM

## 2018-01-01 DIAGNOSIS — G62.9 NEUROPATHY: ICD-10-CM

## 2018-01-01 DIAGNOSIS — N18.2 TYPE 2 DIABETES MELLITUS WITH STAGE 2 CHRONIC KIDNEY DISEASE, WITHOUT LONG-TERM CURRENT USE OF INSULIN (H): Primary | ICD-10-CM

## 2018-01-01 DIAGNOSIS — E78.5 HYPERLIPIDEMIA LDL GOAL <130: ICD-10-CM

## 2018-01-01 DIAGNOSIS — G62.9 NEUROPATHY: Primary | ICD-10-CM

## 2018-01-01 DIAGNOSIS — L89.150 PRESSURE ULCER OF COCCYGEAL REGION, UNSTAGEABLE (H): ICD-10-CM

## 2018-01-01 DIAGNOSIS — C79.51 BONE METASTASIS: ICD-10-CM

## 2018-01-01 DIAGNOSIS — C34.90 RECURRENT NON-SMALL CELL LUNG CANCER (NSCLC) (H): ICD-10-CM

## 2018-01-01 DIAGNOSIS — E11.22 TYPE 2 DIABETES MELLITUS WITH STAGE 2 CHRONIC KIDNEY DISEASE, WITHOUT LONG-TERM CURRENT USE OF INSULIN (H): Primary | ICD-10-CM

## 2018-01-01 DIAGNOSIS — C79.51 BONE METASTASIS: Primary | ICD-10-CM

## 2018-01-01 DIAGNOSIS — C34.90 NON-SMALL CELL LUNG CANCER, UNSPECIFIED LATERALITY (H): Primary | ICD-10-CM

## 2018-01-01 DIAGNOSIS — R60.0 LOWER EXTREMITY EDEMA: ICD-10-CM

## 2018-01-01 DIAGNOSIS — G89.3 CANCER ASSOCIATED PAIN: ICD-10-CM

## 2018-01-01 DIAGNOSIS — I10 BENIGN ESSENTIAL HYPERTENSION: ICD-10-CM

## 2018-01-01 DIAGNOSIS — K59.03 DRUG-INDUCED CONSTIPATION: ICD-10-CM

## 2018-01-01 DIAGNOSIS — C34.90 RECURRENT NON-SMALL CELL LUNG CANCER (NSCLC) (H): Primary | ICD-10-CM

## 2018-01-01 PROCEDURE — 99310 SBSQ NF CARE HIGH MDM 45: CPT | Performed by: NURSE PRACTITIONER

## 2018-01-01 PROCEDURE — 99497 ADVNCD CARE PLAN 30 MIN: CPT | Performed by: NURSE PRACTITIONER

## 2018-01-01 PROCEDURE — G8979 MOBILITY GOAL STATUS: HCPCS | Mod: GP,CI | Performed by: PHYSICAL THERAPIST

## 2018-01-01 PROCEDURE — 97110 THERAPEUTIC EXERCISES: CPT | Mod: GP | Performed by: PHYSICAL THERAPIST

## 2018-01-01 PROCEDURE — 97140 MANUAL THERAPY 1/> REGIONS: CPT | Mod: GP | Performed by: PHYSICAL THERAPIST

## 2018-01-01 PROCEDURE — G8978 MOBILITY CURRENT STATUS: HCPCS | Mod: GP,CJ | Performed by: PHYSICAL THERAPIST

## 2018-01-01 PROCEDURE — 40000718 ZZHC STATISTIC PT DEPARTMENT ORTHO VISIT: Performed by: PHYSICAL THERAPIST

## 2018-01-01 PROCEDURE — 99309 SBSQ NF CARE MODERATE MDM 30: CPT | Performed by: NURSE PRACTITIONER

## 2018-01-01 PROCEDURE — 97161 PT EVAL LOW COMPLEX 20 MIN: CPT | Mod: GP | Performed by: PHYSICAL THERAPIST

## 2018-01-01 PROCEDURE — G8980 MOBILITY D/C STATUS: HCPCS | Mod: GP,CI | Performed by: PHYSICAL THERAPIST

## 2018-01-01 RX ORDER — BISACODYL 10 MG
10 SUPPOSITORY, RECTAL RECTAL DAILY PRN
COMMUNITY
Start: 2018-01-01 | End: 2018-01-01

## 2018-01-01 RX ORDER — FENTANYL 75 UG/1
1 PATCH TRANSDERMAL
COMMUNITY

## 2018-01-01 RX ORDER — ALBUTEROL SULFATE 0.83 MG/ML
2.5 SOLUTION RESPIRATORY (INHALATION) EVERY 4 HOURS PRN
COMMUNITY

## 2018-01-01 RX ORDER — MORPHINE SULFATE 20 MG/ML
SOLUTION ORAL
COMMUNITY

## 2018-01-01 RX ORDER — POTASSIUM CHLORIDE 750 MG/1
10 CAPSULE, EXTENDED RELEASE ORAL 2 TIMES DAILY
COMMUNITY

## 2018-01-01 RX ORDER — FENTANYL 25 UG/1
1 PATCH TRANSDERMAL
Qty: 10 PATCH | Refills: 0 | Status: SHIPPED | OUTPATIENT
Start: 2018-01-01 | End: 2018-01-01

## 2018-01-01 RX ORDER — DEXAMETHASONE 4 MG/1
4 TABLET ORAL 3 TIMES DAILY
Qty: 60 TABLET | Refills: 1 | Status: SHIPPED | OUTPATIENT
Start: 2018-01-01 | End: 2018-01-01

## 2018-01-01 RX ORDER — FENTANYL 50 UG/1
1 PATCH TRANSDERMAL
Qty: 5 PATCH | Refills: 0 | Status: SHIPPED | OUTPATIENT
Start: 2018-01-01 | End: 2018-01-01

## 2018-01-01 RX ORDER — OXYCODONE AND ACETAMINOPHEN 5; 325 MG/1; MG/1
2 TABLET ORAL EVERY 6 HOURS PRN
COMMUNITY
Start: 2018-01-01 | End: 2018-01-01

## 2018-01-01 RX ORDER — HYDROCHLOROTHIAZIDE 25 MG/1
25 TABLET ORAL DAILY
Qty: 90 TABLET | Refills: 0 | Status: SHIPPED | OUTPATIENT
Start: 2018-01-01 | End: 2018-01-01

## 2018-01-01 RX ORDER — CEPHALEXIN 500 MG/1
2000 CAPSULE ORAL SEE ADMIN INSTRUCTIONS
COMMUNITY
Start: 2018-01-01

## 2018-01-01 RX ORDER — TRAMADOL HYDROCHLORIDE 50 MG/1
1 TABLET ORAL 3 TIMES DAILY PRN
COMMUNITY
Start: 2018-01-01 | End: 2018-01-01

## 2018-01-01 RX ORDER — GABAPENTIN 300 MG/1
CAPSULE ORAL
COMMUNITY
Start: 2018-01-01

## 2018-01-01 RX ORDER — SENNOSIDES 8.6 MG
650 CAPSULE ORAL EVERY 6 HOURS PRN
COMMUNITY
Start: 2014-09-15 | End: 2018-01-01

## 2018-01-01 RX ORDER — AMLODIPINE BESYLATE 10 MG/1
TABLET ORAL
Qty: 90 TABLET | Refills: 0 | Status: SHIPPED | OUTPATIENT
Start: 2018-01-01 | End: 2018-01-01

## 2018-01-01 RX ORDER — AMOXICILLIN 250 MG
2 CAPSULE ORAL 2 TIMES DAILY
COMMUNITY

## 2018-01-01 RX ORDER — PREDNISONE 10 MG/1
TABLET ORAL
COMMUNITY
Start: 2018-01-01 | End: 2018-01-01

## 2018-05-14 NOTE — PROGRESS NOTES
[facility]          OUTPATIENT PHYSICAL THERAPY ORTHOPEDIC EVALUATION  PLAN OF TREATMENT FOR OUTPATIENT REHABILITATION  (COMPLETE FOR INITIAL CLAIMS ONLY)  Patient's Last Name, First Name, M.I.  YOB: 1932  BentonAnibal  TENA    Provider s Name:  [facility]   Medical Record No.  [id number]   Start of Care Date:  05/14/18   Onset Date:  05/03/18   Type:     _X__PT   ___OT   ___SLP Medical Diagnosis:  L Knee Pain     PT Diagnosis:  Decreased ROM and strength secondary to B knee pain   Visits from SOC:  1      _________________________________________________________________________________  Plan of Treatment/Functional Goals:  IADL retraining, balance training, joint mobilization, manual therapy, motor coordination training, neuromuscular re-education, ROM, strengthening, stretching, transfer training     Ultrasound     Goals  Goal Identifier: ROM  Goal Description: Pt will demonstrate atleast 113* flexion BLE in order to increase quad flexibility for decreased pain with transfers.  Target Date: 07/23/18    Goal Identifier: Walking  Goal Description: Pt will demonstrate full participation with walking >.25 mile without quad pain interfering with mobility.  Target Date: 07/23/18    Goal Identifier: HEP  Goal Description: Pt will demonstrate increased participation with HEP including proper form and duration in order to encourage self management of symptoms.  Target Date: 07/09/18    Therapy Frequency:  1 time/week  Predicted Duration of Therapy Intervention:  10 weeks    Gisselle Johns, PT                 I CERTIFY THE NEED FOR THESE SERVICES FURNISHED UNDER        THIS PLAN OF TREATMENT AND WHILE UNDER MY CARE .             Physician Signature               Date    X_____________________________________________________                             Certification Date From:  05/14/18   Certification Date To:  07/23/18    Referring Provider:  Srikanth Martin  Assessment        See Epic Evaluation Start of Care Date: 05/14/18

## 2018-05-14 NOTE — PROGRESS NOTES
" 05/14/18 1400   General Information   Type of Visit Initial OP Ortho PT Evaluation   Start of Care Date 05/14/18   Referring Physician Srikanth   Patient/Family Goals Statement Decrease pain at night in order to fall asleep, increase strength in the leg   Orders Evaluate and Treat   Date of Order 05/03/18   Insurance Type Medicare   Medical Diagnosis L Knee strengthening   Surgical/Medical history reviewed Yes   Precautions/Limitations no known precautions/limitations   Body Part(s)   Body Part(s) Knee   Presentation and Etiology   Pertinent history of current problem (include personal factors and/or comorbidities that impact the POC) \"Brian\"- pt states in February he twisted on his leg, he went to lift something and it wouldn't move. pt states there was a bulge in his muscle on the top of his left thigh. pt gets massages, had one last Thursday and the massage lady could not find anything.    Impairments A. Pain   Functional Limitations perform desired leisure / sports activities;perform activities of daily living   Symptom Location upper legs   How/Where did it occur With repetition/overuse   Onset date of current episode/exacerbation 05/03/18   Chronicity Chronic   Pain rating (0-10 point scale) Best (/10);Worst (/10)   Best (/10) 1   Worst (/10) 7   Pain quality A. Sharp;F. Stabbing;G. Cramping   Frequency of pain/symptoms C. With activity   Pain/symptoms exacerbated by C. Lifting;D. Carrying   Pain/symptoms eased by E. Changing positions   Current Level of Function   Patient role/employment history F. Retired   Fall Risk Screen   Fall screen completed by PT   Have you fallen 2 or more times in the past year? No   Have you fallen and had an injury in the past year? No   Is patient a fall risk? No   Knee Objective Findings   Side (if bilateral, select both right and left) Left   Apprehension Test - B   Palpation Increased tightness in B total knee replacement scar tissue with L knee replacement adhered to inferior " tissue, fascial tightness B L>R   Left Knee Extension AROM lacking 2 * full extension L.   Left Knee Flexion AROM 110* flexion L. 113* flexion R.   Left Knee Flexion Strength 5/5 B   Left Knee Extension Strength 5/5 B   Left Hip Flexor Flexibility + tightness B   Left Quadricep Flexibility + tightness B   Planned Therapy Interventions   Planned Therapy Interventions IADL retraining;balance training;joint mobilization;manual therapy;motor coordination training;neuromuscular re-education;ROM;strengthening;stretching;transfer training   Planned Modality Interventions   Planned Modality Interventions Ultrasound   Clinical Impression   Criteria for Skilled Therapeutic Interventions Met yes, treatment indicated   PT Diagnosis Decreased ROM and strength secondary to B knee pain   Influenced by the following impairments decreased walking tolerance   Functional limitations due to impairments decreased participation with walking   Clinical Presentation Stable/Uncomplicated   Clinical Presentation Rationale pt presents with stable comorbidities, motivated   Clinical Decision Making (Complexity) Low complexity   Therapy Frequency 1 time/week   Predicted Duration of Therapy Intervention (days/wks) 10 weeks   Risk & Benefits of therapy have been explained Yes   Patient, Family & other staff in agreement with plan of care Yes   Education Assessment   Preferred Learning Style Pictures/video   Barriers to Learning No barriers   ORTHO GOALS   PT Ortho Eval Goals 1;2;3   Ortho Goal 1   Goal Identifier ROM   Goal Description Pt will demonstrate atleast 113* flexion BLE in order to increase quad flexibility for decreased pain with transfers.   Target Date 07/23/18   Ortho Goal 2   Goal Identifier Walking   Goal Description Pt will demonstrate full participation with walking >.25 mile without quad pain interfering with mobility.   Target Date 07/23/18   Ortho Goal 3   Goal Identifier HEP   Goal Description Pt will demonstrate increased  participation with HEP including proper form and duration in order to encourage self management of symptoms.   Target Date 07/09/18   Total Evaluation Time   Total Evaluation Time 20   Therapy Certification   Certification date from 05/14/18   Certification date to 07/23/18   Medical Diagnosis L Knee Pain

## 2018-06-22 NOTE — PROGRESS NOTES
"Outpatient Physical Therapy Discharge Note     Patient: Anibal Bhardwaj  : 1932    Beginning/End Dates of Reporting Period:  2018 to 2018    Referring Provider: Srikanth Arcos Diagnosis: Decreased ROM and strength secondary to knee and quad pain     Client Self Report: \"Brian\"-     Objective Measurements:  Objective Measure: Knee AROM  Details: 113* L. 117* R.      Goals:  Goal Identifier ROM   Goal Description Pt will demonstrate atleast 113* flexion BLE in order to increase quad flexibility for decreased pain with transfers.   Target Date 18   Date Met  18   Progress:     Goal Identifier Walking   Goal Description Pt will demonstrate full participation with walking >.25 mile without quad pain interfering with mobility.   Target Date 18   Date Met  18   Progress:     Goal Identifier HEP   Goal Description Pt will demonstrate increased participation with HEP including proper form and duration in order to encourage self management of symptoms.   Target Date 18   Date Met  18   Progress:     Progress Toward Goals:   Progress this reporting period: pt demonstrates improved progress as indicated by improved walking ability and increased ROM. Pt is motivated to discharge and continue performing stretches independently.          Plan:  Discharge from therapy.    Discharge:    Reason for Discharge: Patient has met all goals.    Equipment Issued: none    Discharge Plan: Patient to continue home program.  "

## 2018-07-18 PROBLEM — R07.89 LEFT-SIDED CHEST WALL PAIN: Status: ACTIVE | Noted: 2018-01-01

## 2018-07-18 PROBLEM — C34.90: Status: ACTIVE | Noted: 2018-01-01

## 2018-07-18 NOTE — MR AVS SNAPSHOT
After Visit Summary   7/18/2018    Anibal Bhardwaj    MRN: [id number]           Patient Information     Date Of Birth          9/18/1932        Visit Information        Provider Department      7/18/2018 9:15 AM Renata Bustillo MD [facility]        Today's Diagnoses     Neuropathy    -  1    Recurrent non-small cell lung cancer (NSCLC) (H)        Bone metastasis (H)           Follow-ups after your visit        Your next 10 appointments already scheduled     Jul 18, 2018 11:15 AM CDT   SIMULATION with Renata Busitllo MD, Lr [facility]   [facility] ([facility])    [address], [address]  [place] [address]   [phone]              Who to contact     Please call your clinic at [phone] to:    Ask questions about your health    Make or cancel appointments    Discuss your medicines    Learn about your test results    Speak to your doctor            Additional Information About Your Visit        Care EveryWhere ID     This is your Care EveryWhere ID. This could be used by other organizations to access your [place] medical records  [id number]        Your Vitals Were     Pulse Respirations Pulse Oximetry BMI (Body Mass Index)          73 18 96% 34.14 kg/m2         Blood Pressure from Last 3 Encounters:   07/18/18 148/74   07/24/17 138/54   06/28/17 120/58    Weight from Last 3 Encounters:   07/18/18 104.9 kg (231 lb 3.2 oz)   06/28/17 107 kg (236 lb)   01/09/17 104.8 kg (231 lb)              Today, you had the following     No orders found for display         Today's Medication Changes          These changes are accurate as of 7/18/18 10:53 AM.  If you have any questions, ask your nurse or doctor.               Start taking these medicines.        Dose/Directions    dexamethasone 4 MG tablet   Commonly known as:  DECADRON   Used for:  Neuropathy   Started by:  Sari Tristan RN        Dose:  4 mg   Take 1 tablet (4 mg) by mouth  3 times daily   Quantity:  60 tablet   Refills:  1       fentaNYL 25 mcg/hr 72 hr patch   Commonly known as:  DURAGESIC   Used for:  Bone metastasis (H)   Started by:  Renata Bustillo MD        Dose:  1 patch   Place 1 patch onto the skin every 72 hours remove old patch.   Quantity:  10 patch   Refills:  0            Where to get your medicines      These medications were sent to [facility] [place], [place] - [address]  [address] [address]     Phone:  [phone]     dexamethasone 4 MG tablet         Some of these will need a paper prescription and others can be bought over the counter.  Ask your nurse if you have questions.     Bring a paper prescription for each of these medications     fentaNYL 25 mcg/hr 72 hr patch               Information about OPIOIDS     PRESCRIPTION OPIOIDS: WHAT YOU NEED TO KNOW   We gave you an opioid (narcotic) pain medicine. It is important to manage your pain, but opioids are not always the best choice. You should first try all the other options your care team gave you. Take this medicine for as short a time (and as few doses) as possible.     These medicines have risks:    DO NOT drive when on new or higher doses of pain medicine. These medicines can affect your alertness and reaction times, and you could be arrested for driving under the influence (DUI). If you need to use opioids long-term, talk to your care team about driving.    DO NOT operate heave machinery    DO NOT do any other dangerous activities while taking these medicines.     DO NOT drink any alcohol while taking these medicines.      If the opioid prescribed includes acetaminophen, DO NOT take with any other medicines that contain acetaminophen. Read all labels carefully. Look for the word  acetaminophen  or  Tylenol.  Ask your pharmacist if you have questions or are unsure.    You can get addicted to pain medicines, especially if you have a history of addiction  (chemical, alcohol or substance dependence). Talk to your care team about ways to reduce this risk.    Store your pills in a secure place, locked if possible. We will not replace any lost or stolen medicine. If you don t finish your medicine, please throw away (dispose) as directed by your pharmacist. The [place] Pollution Control Agency has more information about safe disposal: https://www.pca.[facility].[place]/living-green/managing-unwanted-medications.     All opioids tend to cause constipation. Drink plenty of water and eat foods that have a lot of fiber, such as fruits, vegetables, prune juice, apple juice and high-fiber cereal. Take a laxative (Miralax, milk of magnesia, Colace, Senna) if you don t move your bowels at least every other day.          Primary Care Provider Office Phone # Fax #    Michele Mcgovern [phone] [address] [place] [address]        Equal Access to Services     NELLIE ESTEVES : Hadii carmenza vega hadasho Soomaali, waaxda luqadaha, qaybta kaalmada adeegyadavi, giselle elkins . So [facility] [phone].    ATENCIÓN: Si habla español, tiene a kaye disposición servicios gratuitos de asistencia lingüística. Llame al [phone].    We comply with applicable federal civil rights laws and [place] laws. We do not discriminate on the basis of race, color, national origin, age, disability, sex, sexual orientation, or gender identity.            Thank you!     Thank you for choosing [facility]  for your care. Our goal is always to provide you with excellent care. Hearing back from our patients is one way we can continue to improve our services. Please take a few minutes to complete the written survey that you may receive in the mail after your visit with us. Thank you!             Your Updated Medication List - Protect others around you: Learn how to safely use, store and throw away your medicines at www.disposemymeds.org.          This list  is accurate as of 7/18/18 10:53 AM.  Always use your most recent med list.                   Brand Name Dispense Instructions for use Diagnosis    acetaminophen 650 MG CR tablet    TYLENOL     Take 650 mg by mouth every 6 hours as needed        amLODIPine 10 MG tablet    NORVASC    90 tablet    Take 1 tablet (10 mg) by mouth daily    Essential hypertension with goal blood pressure less than 140/90       aspirin 81 MG tablet      Take 81 mg by mouth daily        cephALEXin 500 MG capsule    KEFLEX     Take 2,000 mg by mouth See Admin Instructions Take 2000 mg ( 4 capsules) prior to dental procuedures        dexamethasone 4 MG tablet    DECADRON    60 tablet    Take 1 tablet (4 mg) by mouth 3 times daily    Neuropathy       fentaNYL 25 mcg/hr 72 hr patch    DURAGESIC    10 patch    Place 1 patch onto the skin every 72 hours remove old patch.    Bone metastasis (H)       gabapentin 300 MG capsule    NEURONTIN     Take 1 capsule by mouth See Admin Instructions Take 1 tab at HS and may increase to 2 tabs at HS for pain.        hydrochlorothiazide 25 MG tablet    HYDRODIURIL    90 tablet    Take 1 tablet (25 mg) by mouth daily    Essential hypertension with goal blood pressure less than 140/90       metoprolol succinate 25 MG 24 hr tablet    TOPROL-XL    90 tablet    Take 1 tablet (25 mg) by mouth daily DUE FOR APPOINTMENT July 2018. NO FURTHER REFILLS    Essential hypertension with goal blood pressure less than 140/90       oxyCODONE-acetaminophen 5-325 MG per tablet    PERCOCET     Take 2 tablets by mouth every 6 hours as needed        simvastatin 20 MG tablet    ZOCOR    90 tablet    TAKE 1 TABLET AT BEDTIME    Hyperlipidemia LDL goal <130       traMADol 50 MG tablet    ULTRAM     Take 1 tablet by mouth 3 times daily as needed        triamcinolone 0.1 % cream    KENALOG    60 g    Apply topically 2 times daily For leg rash    Dermatitis

## 2018-07-18 NOTE — PROGRESS NOTES
RADIATION ONCOLOGY CONSULT NOTE  Date of Visit: 2018    Anibal Bhardwaj  MRN: [id number]  : 1932    Anibal Bhardwaj is being seen today for initial consultation at the request of Dr. Shashank Vazquez for consideration of radiation therapy for bone mets from lung CA. All pertinent labs, imaging, and pathology findings have been reviewed.       HISTORY OF PRESENT ILLNESS:  Mr. Bhardwaj is a 85 year old male sas diagnosed with non-small cell lung cancer in . He presented with a 4.7 cm cavitary lesion seen on CT scan in the left lower lobe. He underwent a VATS procedure in 2016. At that time he had grade 3 adenocarcinoma and pleural biopsies were positive. By 2017 he had pleural-based nodules noted consistent with recurrent disease. Discussion ensued w/ surveillance determined to be the best option.    Follow-up CT scans indicated further progression.  Most recently a PET scan on 18 revealed the postoperative changes in the left lower lobe. However areas of left pleural nodularity had developed since the PET scan in 2017. There is a 4 cm left lateral chest wall mass with left eighth rib involvement. FDG avid nodes and pulmonary nodules were seen as well as multiple skeletal lesions.     Dr. Vazquez recommended that he be evaluated for palliative radiation. Also he plans to start Keytruda later today.    Mr. Bhardwaj tells me that he's noted left chest pain for the past 2 months but upper back pain has become quite severe inft hand which is Osbaldo him. He also seems to have neuropathic pain from the elbows down bilaterally. He currently is using oxycodone 2 every 6 hours which decreases his pain somewhat.    CHEMOTHERAPY HISTORY: none    PAST RADIATION THERAPY HISTORY: none     IMPLANTED CARDIAC DEVICE: none    PAST MEDICAL/SURGICAL HISTORY:  Past Medical History:   Diagnosis Date     Adenocarcinoma, lung, left (H) 2016     Type 2 diabetes mellitus with stage 2 chronic kidney  disease, without long-term current use of insulin (H)      Past Surgical History:   Procedure Laterality Date     ARTHROSCOPY KNEE  5/31/2013    Procedure: ARTHROSCOPY KNEE;  Left Knee Loose Body Removal;  Surgeon: Ley, Jeffrey Duane, MD;  Location: [facility]     ARTHROSCOPY KNEE Left 8/20/2014    Procedure: ARTHROSCOPY KNEE;  Surgeon: Jaylon Guo MD;  Location:  OR     C TOTAL HIP ARTHROPLASTY  10/'9/2015    left hip     C TOTAL KNEE ARTHROPLASTY  2000    Both knees replaced in 2000     C TOTAL KNEE ARTHROPLASTY  11/10/2014    left knee     COLONOSCOPY  3/14/2012    Procedure:COLONOSCOPY; Colonoscopy; Surgeon:AUGUSTIN PRUITT; Location:[facility]     KNEE SURGERY  9/3/2014    left knee surgery to remove total joint, place spacer and antibiotics     NOSE SURGERY      ?septoplasty     REVISION TOTAL JOINT REPLACEMENT KNEE  April, 2015    left knee     SURGICAL HISTORY OF -       Tonsils     SURGICAL HISTORY OF -   5/2006    Colonoscopy-hyperplastic polyps.  Hx of adenomatous polyps.  Repeat in 5 years.     SURGICAL HISTORY OF -   11/20/2007    cysto-BPH       ALLERGIES:  Allergies as of 07/18/2018 - In Progress 07/18/2018   Allergen Reaction Noted     Bees Anaphylaxis 09/05/2014     Lisinopril Cough 10/04/2016     Amoxicillin-pot clavulanate GI Disturbance 11/10/2016     Grass  04/24/2015     Trees  04/24/2015       MEDICATIONS:  Current Outpatient Prescriptions   Medication Sig Dispense Refill     acetaminophen (TYLENOL) 650 MG CR tablet Take 650 mg by mouth every 6 hours as needed       cephALEXin (KEFLEX) 500 MG capsule Take 2,000 mg by mouth See Admin Instructions Take 2000 mg ( 4 capsules) prior to dental procuedures       fentaNYL (DURAGESIC) 25 mcg/hr 72 hr patch Place 1 patch onto the skin every 72 hours remove old patch. 10 patch 0     gabapentin (NEURONTIN) 300 MG capsule Take 1 capsule by mouth See Admin Instructions Take 1 tab at HS and may increase to 2 tabs at HS for pain.        oxyCODONE-acetaminophen (PERCOCET) 5-325 MG per tablet Take 2 tablets by mouth every 6 hours as needed       [DISCONTINUED] dexamethasone (DECADRON) 4 MG tablet Take 1 tablet (4 mg) by mouth 3 times daily 60 tablet 1     amLODIPine (NORVASC) 10 MG tablet Take 1 tablet (10 mg) by mouth daily 90 tablet 3     aspirin 81 MG tablet Take 81 mg by mouth daily        dexamethasone (DECADRON) 4 MG tablet Take 1 tablet (4 mg) by mouth 3 times daily 60 tablet 1     hydrochlorothiazide (HYDRODIURIL) 25 MG tablet Take 1 tablet (25 mg) by mouth daily 90 tablet 3     metoprolol succinate (TOPROL-XL) 25 MG 24 hr tablet Take 1 tablet (25 mg) by mouth daily DUE FOR APPOINTMENT July 2018. NO FURTHER REFILLS 90 tablet 3     simvastatin (ZOCOR) 20 MG tablet TAKE 1 TABLET AT BEDTIME 90 tablet 3     traMADol (ULTRAM) 50 MG tablet Take 1 tablet by mouth 3 times daily as needed       triamcinolone (KENALOG) 0.1 % cream Apply topically 2 times daily For leg rash 60 g 1        FAMILY HISTORY:  Family History   Problem Relation Age of Onset     GASTROINTESTINAL DISEASE Mother      liver damage from farm chemicals     HEART DISEASE Father      70s     C.A.D. Brother      50s     Obesity Sister      Diabetes Sister      Colon Cancer No family hx of      Prostate Cancer No family hx of        SOCIAL HISTORY:  Social History     Social History     Marital status:      Spouse name: N/A     Number of children: N/A     Years of education: N/A     Occupational History     Not on file.     Social History Main Topics     Smoking status: Never Smoker     Smokeless tobacco: Never Used     Alcohol use Yes      Comment: rarely glass of wine     Drug use: No     Sexual activity: Yes     Partners: Female     Other Topics Concern     Parent/Sibling W/ Cabg, Mi Or Angioplasty Before 65f 55m? Yes     Social History Narrative       REVIEW OF SYSTEMS: A 10 point review of systems was obtained. Pertinent findings are noted in the HPI and are as follows:    appetite is somewhat decreased. His weight is not significantly changed. He notes fatigue. He notes muscle weakness as indicated above. He notes no GI symptoms. In terms of urinary symptoms he notes daytime frequency and nocturia. He also notes sexual dysfunction.    PHYSICAL EXAM:  VITALS: /74  Pulse 73  Resp 18  Wt 104.9 kg (231 lb 3.2 oz)  SpO2 96%  BMI 34.14 kg/m2  GEN: Appears well, alert, oriented, and in NAD  HEENT: NCAT, PERRL, EOMI, normal conjunctiva, MMM, no thrush  NECK: Supple, full ROM, no cervical or clavicular lymphadenopathy  CV: RRR, no murmurs/rubs/gallops, warm and well-perfused  RESP: CTAB, no wheezes/rales/rhonchi, good aeration throughout all lung fields, breathing comfortably on room air  ABDOMEN: Soft, NT, ND, bowel sounds present  SKIN: Normal color and turgor  NEURO:  CN 3-12 grossly intact, normal and symmetric motor and sensory exam in bilateral upper and lower extremities, normal gait, strength is 5/5 in all extremities except for the left hand  and intraosseous muscles of the left hand which is approximately 3/5  PSYCH: Appropriate mood and affect  STATION AND GAIT:  The patient has significant difficulty lying flat because of pain.    RADIOLOGIC STUDIES: PET scan on 6/16/18 revealed multiple skeletal lesions at C6, T2, T3, T4, L4, sacrum, iliac bones, left proximal humerus, distal right humerus.      PATHOLOGY:  Recent biopsy of the left intercostal mass on 6/27/18 confirmed adenocarcinoma.    IMPRESSION:  In summary, Mr. Bhardwaj is a 85 year old male who presents with metastatic lung CA to bone as well as lymph nodes and pleura. He is having significant discomfort.  He is noting decrease of strength in L hand.      RECOMMENDATION:  Mr. Bhardwaj and his family about the use of palliative radiation. I plan to treat the upper T-spine and the left lateral chest wall.     I'm particularly concerned about the loss of strength in his left arm and have started him on Decadron 4  mg t.i.d. which can be weaned after he completes his course of radiation.     I plan to treat the upper spine to a dose of 30 gr 10 fractions. The left rib lesion will likely be treated in 1 fraction of 8 gray. I will also review the lesions in the distal right humerus and left proximal humerus but wanted to start the radiation to the upper spine as quickly as possible given his neurologic loss.    I added Duragesic 25 to his pain regimen as his pain is fairly poorly controlled at this point. I am hopeful that will make him more comfortable.      The rationale, logistics, risks, benefits, and potential side effects of the proposed treatment were reviewed in detail.     Renata Bustillo M.D.  Radiation Oncology

## 2018-07-18 NOTE — LETTER
2018      RE: Anibal Bhardwaj  [address] [facility] [address]       RADIATION ONCOLOGY CONSULT NOTE  Date of Visit: 2018    Anibal Bhardwaj  MRN: [id number]  : 1932    Anibal Bhardwaj is being seen today for initial consultation at the request of Dr. Shashank Vazquez for consideration of radiation therapy for bone mets from lung CA. All pertinent labs, imaging, and pathology findings have been reviewed.       HISTORY OF PRESENT ILLNESS:  Mr. Bhardwaj is a 85 year old male sas diagnosed with non-small cell lung cancer in . He presented with a 4.7 cm cavitary lesion seen on CT scan in the left lower lobe. He underwent a VATS procedure in 2016. At that time he had grade 3 adenocarcinoma and pleural biopsies were positive. By 2017 he had pleural-based nodules noted consistent with recurrent disease. Discussion ensued w/ surveillance determined to be the best option.    Follow-up CT scans indicated further progression.  Most recently a PET scan on 18 revealed the postoperative changes in the left lower lobe. However areas of left pleural nodularity had developed since the PET scan in 2017. There is a 4 cm left lateral chest wall mass with left eighth rib involvement. FDG avid nodes and pulmonary nodules were seen as well as multiple skeletal lesions.     Dr. Vazquez recommended that he be evaluated for palliative radiation. Also he plans to start Keytruda later today.    Mr. Bhardwaj tells me that he's noted left chest pain for the past 2 months but upper back pain has become quite severe inft hand which is Osbaldo him. He also seems to have neuropathic pain from the elbows down bilaterally. He currently is using oxycodone 2 every 6 hours which decreases his pain somewhat.    CHEMOTHERAPY HISTORY: none    PAST RADIATION THERAPY HISTORY: none     IMPLANTED CARDIAC DEVICE: none    PAST MEDICAL/SURGICAL HISTORY:  Past Medical History:   Diagnosis Date     Adenocarcinoma,  lung, left (H) 12/21/2016     Type 2 diabetes mellitus with stage 2 chronic kidney disease, without long-term current use of insulin (H)      Past Surgical History:   Procedure Laterality Date     ARTHROSCOPY KNEE  5/31/2013    Procedure: ARTHROSCOPY KNEE;  Left Knee Loose Body Removal;  Surgeon: Ley, Jeffrey Duane, MD;  Location: [facility]     ARTHROSCOPY KNEE Left 8/20/2014    Procedure: ARTHROSCOPY KNEE;  Surgeon: Jaylon Guo MD;  Location:  OR     C TOTAL HIP ARTHROPLASTY  10/'9/2015    left hip     C TOTAL KNEE ARTHROPLASTY  2000    Both knees replaced in 2000     C TOTAL KNEE ARTHROPLASTY  11/10/2014    left knee     COLONOSCOPY  3/14/2012    Procedure:COLONOSCOPY; Colonoscopy; Surgeon:AUGUSTIN PRUITT; Location:[facility]     KNEE SURGERY  9/3/2014    left knee surgery to remove total joint, place spacer and antibiotics     NOSE SURGERY      ?septoplasty     REVISION TOTAL JOINT REPLACEMENT KNEE  April, 2015    left knee     SURGICAL HISTORY OF -       Tonsils     SURGICAL HISTORY OF -   5/2006    Colonoscopy-hyperplastic polyps.  Hx of adenomatous polyps.  Repeat in 5 years.     SURGICAL HISTORY OF -   11/20/2007    cysto-BPH       ALLERGIES:  Allergies as of 07/18/2018 - In Progress 07/18/2018   Allergen Reaction Noted     Bees Anaphylaxis 09/05/2014     Lisinopril Cough 10/04/2016     Amoxicillin-pot clavulanate GI Disturbance 11/10/2016     Grass  04/24/2015     Trees  04/24/2015       MEDICATIONS:  Current Outpatient Prescriptions   Medication Sig Dispense Refill     acetaminophen (TYLENOL) 650 MG CR tablet Take 650 mg by mouth every 6 hours as needed       cephALEXin (KEFLEX) 500 MG capsule Take 2,000 mg by mouth See Admin Instructions Take 2000 mg ( 4 capsules) prior to dental procuedures       fentaNYL (DURAGESIC) 25 mcg/hr 72 hr patch Place 1 patch onto the skin every 72 hours remove old patch. 10 patch 0     gabapentin (NEURONTIN) 300 MG capsule Take 1 capsule by mouth See Admin Instructions  Take 1 tab at HS and may increase to 2 tabs at HS for pain.       oxyCODONE-acetaminophen (PERCOCET) 5-325 MG per tablet Take 2 tablets by mouth every 6 hours as needed       [DISCONTINUED] dexamethasone (DECADRON) 4 MG tablet Take 1 tablet (4 mg) by mouth 3 times daily 60 tablet 1     amLODIPine (NORVASC) 10 MG tablet Take 1 tablet (10 mg) by mouth daily 90 tablet 3     aspirin 81 MG tablet Take 81 mg by mouth daily        dexamethasone (DECADRON) 4 MG tablet Take 1 tablet (4 mg) by mouth 3 times daily 60 tablet 1     hydrochlorothiazide (HYDRODIURIL) 25 MG tablet Take 1 tablet (25 mg) by mouth daily 90 tablet 3     metoprolol succinate (TOPROL-XL) 25 MG 24 hr tablet Take 1 tablet (25 mg) by mouth daily DUE FOR APPOINTMENT July 2018. NO FURTHER REFILLS 90 tablet 3     simvastatin (ZOCOR) 20 MG tablet TAKE 1 TABLET AT BEDTIME 90 tablet 3     traMADol (ULTRAM) 50 MG tablet Take 1 tablet by mouth 3 times daily as needed       triamcinolone (KENALOG) 0.1 % cream Apply topically 2 times daily For leg rash 60 g 1        FAMILY HISTORY:  Family History   Problem Relation Age of Onset     GASTROINTESTINAL DISEASE Mother      liver damage from farm chemicals     HEART DISEASE Father      70s     C.A.D. Brother      50s     Obesity Sister      Diabetes Sister      Colon Cancer No family hx of      Prostate Cancer No family hx of        SOCIAL HISTORY:  Social History     Social History     Marital status:      Spouse name: N/A     Number of children: N/A     Years of education: N/A     Occupational History     Not on file.     Social History Main Topics     Smoking status: Never Smoker     Smokeless tobacco: Never Used     Alcohol use Yes      Comment: rarely glass of wine     Drug use: No     Sexual activity: Yes     Partners: Female     Other Topics Concern     Parent/Sibling W/ Cabg, Mi Or Angioplasty Before 65f 55m? Yes     Social History Narrative       REVIEW OF SYSTEMS: A 10 point review of systems was  obtained. Pertinent findings are noted in the HPI and are as follows:  His appetite is somewhat decreased. His weight is not significantly changed. He notes fatigue. He notes muscle weakness as indicated above. He notes no GI symptoms. In terms of urinary symptoms he notes daytime frequency and nocturia. He also notes sexual dysfunction.    PHYSICAL EXAM:  VITALS: /74  Pulse 73  Resp 18  Wt 104.9 kg (231 lb 3.2 oz)  SpO2 96%  BMI 34.14 kg/m2  GEN: Appears well, alert, oriented, and in NAD  HEENT: NCAT, PERRL, EOMI, normal conjunctiva, MMM, no thrush  NECK: Supple, full ROM, no cervical or clavicular lymphadenopathy  CV: RRR, no murmurs/rubs/gallops, warm and well-perfused  RESP: CTAB, no wheezes/rales/rhonchi, good aeration throughout all lung fields, breathing comfortably on room air  ABDOMEN: Soft, NT, ND, bowel sounds present  SKIN: Normal color and turgor  NEURO:  CN 3-12 grossly intact, normal and symmetric motor and sensory exam in bilateral upper and lower extremities, normal gait, strength is 5/5 in all extremities except for the left hand  and intraosseous muscles of the left hand which is approximately 3/5  PSYCH: Appropriate mood and affect  STATION AND GAIT:  The patient has significant difficulty lying flat because of pain.    RADIOLOGIC STUDIES: PET scan on 6/16/18 revealed multiple skeletal lesions at C6, T2, T3, T4, L4, sacrum, iliac bones, left proximal humerus, distal right humerus.      PATHOLOGY:  Recent biopsy of the left intercostal mass on 6/27/18 confirmed adenocarcinoma.    IMPRESSION:  In summary, Mr. Bhardwaj is a 85 year old male who presents with metastatic lung CA to bone as well as lymph nodes and pleura. He is having significant discomfort.  He is noting decrease of strength in L hand.      RECOMMENDATION:  Mr. Bhardwaj and his family about the use of palliative radiation. I plan to treat the upper T-spine and the left lateral chest wall.     I'm particularly concerned  about the loss of strength in his left arm and have started him on Decadron 4 mg t.i.d. which can be weaned after he completes his course of radiation.     I plan to treat the upper spine to a dose of 30 gr 10 fractions. The left rib lesion will likely be treated in 1 fraction of 8 gray. I will also review the lesions in the distal right humerus and left proximal humerus but wanted to start the radiation to the upper spine as quickly as possible given his neurologic loss.    I added Duragesic 25 to his pain regimen as his pain is fairly poorly controlled at this point. I am hopeful that will make him more comfortable.      The rationale, logistics, risks, benefits, and potential side effects of the proposed treatment were reviewed in detail.     Renata Bustillo M.D.  Radiation Oncology    Renata Bustillo MD

## 2018-07-18 NOTE — NURSING NOTE
REASON FOR APPOINTMENT   Type of Cancer: NSCLC with mets to spine  Location: T spine  Date of Symptom Onset: pain worsening over past 4-6 wks. Progression on recent PET scan.    TREATMENT TO-DATE FOR THIS CANCER  Surgery ? 12/2016 LLL wedge resection/thoracotomy   Chemotherapy ? Will start Keytruda today, and have every 3 weeks. No previous chemo   Other Treatments for this Cancer ? No previous radiation. Here to discuss palliative radiation for pain.    PERSONAL HISTORY OF CANCER   Previous Cancer ? no   Prior Radiation ? no   Prior Chemotherapy ? no   Prior Hormonal Therapy ? no     RECENT IMAGING STUDIES  PET 6/2018 - image pushed to PACS, report in Care Everywhere    REFERRALS NEEDED  None at this time     VITALS  /74  Pulse 73  Resp 18  Wt 104.9 kg (231 lb 3.2 oz)  SpO2 96%  BMI 34.14 kg/m2    PACEMAKER/IMPLANTED CARDIAC DEVICE no    PAIN  Current history of pain associated with this visit:   Intensity: 10/10 at worst. Less pain in mornings, worse as day goes on  Current: aching and shooting  Location: L lateral chest, wraps around and no over to the R side, down both arms. Can't get arms comfortable and having trouble opening doors/opening jars/using hands/arms.  Treatment: percocet - 2 tabs Q 6 hrs, IBU in between. Had gabapentin - may be out of that - family will check on refill.    PSYCHOSOCIAL  Marital Status:   Patient lives in [place] with wife, Rosa.  Number of children: 6  Working status: retired  Do you feel safe in your home? Yes    REVIEW OF SYSTEMS  Skin: negative  Eyes: negative  Ears/Nose/Throat: negative  Respiratory: No shortness of breath, dyspnea on exertion, cough, or hemoptysis  Cardiovascular: negative  Gastrointestinal: negative  Genitourinary: nocturia and erectile dysfunction  Musculoskeletal: back pain, neck pain and muscular weakness in B arms/hands  Neurologic: local weakness (hands/arms) and numbness or tingling of hands  Psychiatric:  "negative  Hematologic/Lymphatic/Immunologic: fatigue  Endocrine: negative      Radiation Oncology Patient Teaching    Current Concern: \"why am I feeling weak?\" \"how does the radiation work\"    Person involved with teaching: Patient, Wife, Son - Jay and Daughter-Eda  Patient asked Questions: Yes  Patient was cooperative: Yes  Patient was receptive (willing to accept information given): Yes    Education Assessment  Comprehension ability: Medium  Knowledge level: Medium  Factors affecting teaching: fatigue, pain    Education Materials Given  Radiation Therapy and You  Radiation for Bone Metasteses    Educational Topics Discussed  Side effects, Medications, Activity, Nutrition, Adjustment to illness and When to call MD/RN    Response To Teaching  More review necessary    Do you have an advanced directive or living will? yes  Are you DNR/DNI? no        "

## 2018-07-19 NOTE — TELEPHONE ENCOUNTER
"Requested Prescriptions   Pending Prescriptions Disp Refills     amLODIPine (NORVASC) 10 MG tablet [Pharmacy Med Name: AMLODIPINE BESYLATE TABS 10MG] 90 tablet 3     Sig: TAKE 1 TABLET DAILY    Calcium Channel Blockers Protocol  Failed    7/19/2018 12:07 AM       Failed - Blood pressure under 140/90 in past 12 months    BP Readings from Last 3 Encounters:   07/18/18 148/74   07/24/17 138/54   06/28/17 120/58                Failed - Recent (12 mo) or future (30 days) visit within the authorizing provider's specialty    Patient had office visit in the last 12 months or has a visit in the next 30 days with authorizing provider or within the authorizing provider's specialty.  See \"Patient Info\" tab in inbasket, or \"Choose Columns\" in Meds & Orders section of the refill encounter.           Failed - Normal serum creatinine on file in past 12 months    Recent Labs   Lab Test  10/14/16   1130   CR  1.03            Passed - Patient is age 18 or older        amLODIPine (NORVASC) 10 MG tablet  Last Written Prescription Date:  06/28/2017  Last Fill Quantity: 90 tablet,  # refills: 3   Last office visit: 9/18/2017 with prescribing provider:  06/28/2017 ESTEPHANIA Mcgovern   Future Office Visit:      Kaylee PATEL (R) (M)    "

## 2018-07-25 NOTE — LETTER
2018      RE: Anibal Bhardwaj  [address] [facility] [address]       [facility]  SPECIALIZING IN BREAKTHROUGHS  Radiation Oncology    On Treatment Visit Note      Anibal Bhardwaj      Date: 2018   MRN: [id number]   : 1932  Diagnosis: Metastatic NSCLC      Reason for Visit:  On Radiation Treatment Visit     Treatment Summary to Date  Treatment Site: thoracic spine Current Dose: 1500/3000 cGy Fractions: 5/10           Subjective:   Notes that pain is still severe despite Duragesic 25.  Notes no worsening of neuropathy, but no improvement either.  Remains on Decadron TID.    Nursing ROS:   Nutrition Alteration  Diet Type: Patient's Preference  Skin  Skin Reaction: 0 - No changes        Cardiovascular  Respiratory effort: 2 - Mild dyspnea on exertion  Gastrointestinal  Nausea: 0 - None  GI Note: using stool softeners with pain meds  Genitourinary  Urinary Status: 0 - Normal     Pain Assessment  Pain Note: pain running down arms and fingers, on fentanyl and percocet      Objective:   /66  Pulse 59  Wt 105.2 kg (232 lb)  BMI 34.26 kg/m2  No change.    Toxicities:        Labs:  CBC RESULTS:   Recent Labs   Lab Test  10/14/16   1210   WBC  7.6   RBC  3.72*   HGB  12.1*   HCT  35.4*   MCV  95   MCH  32.5   MCHC  34.2   RDW  12.7   PLT  210     ELECTROLYTES:  Recent Labs   Lab Test  10/14/16   1130   NA  139   POTASSIUM  4.2   CHLORIDE  105   ALISIA  8.9   CO2  28   BUN  16   CR  1.03   GLC  111*       Assessment:    Tolerating radiation therapy well.  All questions and concerns addressed.        Plan:   1. Continue current therapy.    2. Increase Duragesic to 50.  He may need to remain on that if pain not controlled by XRT.    3. Consider weaning Decadron when he completes tx.        Mosaiq chart and setup information reviewed      Medication Review  Med list reviewed with patient?: Yes  Med Note: taking decadron tid    Educational Topic Discussed  Additional  Instructions: sees Dr. Vazquez/med onc beginning of August        Renata Bustillo M.D.      Renata Bustillo MD

## 2018-07-25 NOTE — MR AVS SNAPSHOT
After Visit Summary   7/25/2018    Anibal Bhardwaj    MRN: [id number]           Patient Information     Date Of Birth          9/18/1932        Visit Information        Provider Department      7/25/2018 2:15 PM Renata Bustillo MD [facility]        Today's Diagnoses     Bone metastasis (H)    -  1       Follow-ups after your visit        Your next 10 appointments already scheduled     Jul 26, 2018  1:45 PM CDT   Linear Accelerator with Lr [facility]   [facility] ([facility])    [address], [address]  [facility] [place] [address]   [phone]            Jul 27, 2018  1:45 PM CDT   Linear Accelerator with Lr [facility]   [facility] ([facility])    [address], [address]  [facility] [place] [address]   [phone]            Jul 30, 2018  1:45 PM CDT   Linear Accelerator with Lr [facility]   [facility] ([facility])    [address], [address]  [facility] [place] [address]   [phone]            Jul 31, 2018  1:45 PM CDT   Linear Accelerator with Lr [facility]   [facility] ([facility])    [address], [address]  [facility] [place] [address]   [phone]            Aug 01, 2018  1:45 PM CDT   Linear Accelerator with Lr [facility]   [facility] ([facility])    [address], [address]  [facility] [place] [address]   [phone]            Aug 01, 2018  2:15 PM CDT   ON TREATMENT VISIT with Sudheer Escalona MD   [facility] ([facility])    [address], [address]  [facility] [place] [address]   [phone]              Who to contact     Please call your clinic at [phone] to:    Ask questions about your health    Make or cancel appointments    Discuss your medicines    Learn about your test results    Speak to your doctor            Additional Information About Your Visit        Care EveryWhere ID     This is  your Care EveryWhere ID. This could be used by other organizations to access your [facility] medical records  [id number]        Your Vitals Were     Pulse BMI (Body Mass Index)                59 34.26 kg/m2           Blood Pressure from Last 3 Encounters:   07/25/18 131/66   07/18/18 148/74   07/24/17 138/54    Weight from Last 3 Encounters:   07/25/18 105.2 kg (232 lb)   07/18/18 104.9 kg (231 lb 3.2 oz)   06/28/17 107 kg (236 lb)              Today, you had the following     No orders found for display         Today's Medication Changes          These changes are accurate as of 7/25/18  2:33 PM.  If you have any questions, ask your nurse or doctor.               These medicines have changed or have updated prescriptions.        Dose/Directions    * fentaNYL 25 mcg/hr 72 hr patch   Commonly known as:  DURAGESIC   This may have changed:  Another medication with the same name was added. Make sure you understand how and when to take each.   Used for:  Bone metastasis (H)   Changed by:  Renata Bustillo MD        Dose:  1 patch   Place 1 patch onto the skin every 72 hours remove old patch.   Quantity:  10 patch   Refills:  0       * fentaNYL 50 mcg/hr 72 hr patch   Commonly known as:  DURAGESIC   This may have changed:  You were already taking a medication with the same name, and this prescription was added. Make sure you understand how and when to take each.   Used for:  Bone metastasis (H)   Changed by:  Renata Bustillo MD        Dose:  1 patch   Place 1 patch onto the skin every 72 hours remove old patch.   Quantity:  5 patch   Refills:  0       * Notice:  This list has 2 medication(s) that are the same as other medications prescribed for you. Read the directions carefully, and ask your doctor or other care provider to review them with you.         Where to get your medicines      Some of these will need a paper prescription and others can be bought over the counter.  Ask your nurse if you have  questions.     Bring a paper prescription for each of these medications     fentaNYL 50 mcg/hr 72 hr patch               Information about OPIOIDS     PRESCRIPTION OPIOIDS: WHAT YOU NEED TO KNOW   We gave you an opioid (narcotic) pain medicine. It is important to manage your pain, but opioids are not always the best choice. You should first try all the other options your care team gave you. Take this medicine for as short a time (and as few doses) as possible.     These medicines have risks:    DO NOT drive when on new or higher doses of pain medicine. These medicines can affect your alertness and reaction times, and you could be arrested for driving under the influence (DUI). If you need to use opioids long-term, talk to your care team about driving.    DO NOT operate heave machinery    DO NOT do any other dangerous activities while taking these medicines.     DO NOT drink any alcohol while taking these medicines.      If the opioid prescribed includes acetaminophen, DO NOT take with any other medicines that contain acetaminophen. Read all labels carefully. Look for the word  acetaminophen  or  Tylenol.  Ask your pharmacist if you have questions or are unsure.    You can get addicted to pain medicines, especially if you have a history of addiction (chemical, alcohol or substance dependence). Talk to your care team about ways to reduce this risk.    Store your pills in a secure place, locked if possible. We will not replace any lost or stolen medicine. If you don t finish your medicine, please throw away (dispose) as directed by your pharmacist. The [place] [employer]ution [employer] has more information about safe disposal: https://www.pca.state.[place]/living-green/managing-unwanted-medications.     All opioids tend to cause constipation. Drink plenty of water and eat foods that have a lot of fiber, such as fruits, vegetables, prune juice, apple juice and high-fiber cereal. Take a laxative (Miralax, milk of  magnesia, Colace, Senna) if you don t move your bowels at least every other day.          Primary Care Provider Office Phone # Fax #    Michele Mcgovern [phone] [address] [place] [address]        Equal Access to Services     NELLIE ESTEVES : Hadii carmenza ku hadasho Soomaali, waaxda luqadaha, qaybta kaalmada adeegyada, waxay litzy hayjayevaldez floresalexandreasonia arreaga. So [facility] [phone].    ATENCIÓN: Si habla español, tiene a kaye disposición servicios gratuitos de asistencia lingüística. [facility] [place] [phone].    We comply with applicable federal civil rights laws and [place] laws. We do not discriminate on the basis of race, color, national origin, age, disability, sex, sexual orientation, or gender identity.            Thank you!     Thank you for choosing [facility]  for your care. Our goal is always to provide you with excellent care. Hearing back from our patients is one way we can continue to improve our services. Please take a few minutes to complete the written survey that you may receive in the mail after your visit with us. Thank you!             Your Updated Medication List - Protect others around you: Learn how to safely use, store and throw away your medicines at www.disposemymeds.org.          This list is accurate as of 7/25/18  2:33 PM.  Always use your most recent med list.                   Brand Name Dispense Instructions for use Diagnosis    acetaminophen 650 MG CR tablet    TYLENOL     Take 650 mg by mouth every 6 hours as needed        amLODIPine 10 MG tablet    NORVASC    90 tablet    TAKE 1 TABLET DAILY    Essential hypertension with goal blood pressure less than 140/90       aspirin 81 MG tablet      Take 81 mg by mouth daily        cephALEXin 500 MG capsule    KEFLEX     Take 2,000 mg by mouth See Admin Instructions Take 2000 mg ( 4 capsules) prior to dental procuedures        dexamethasone 4 MG tablet    DECADRON    60 tablet    Take 1 tablet (4 mg)  by mouth 3 times daily    Neuropathy       * fentaNYL 25 mcg/hr 72 hr patch    DURAGESIC    10 patch    Place 1 patch onto the skin every 72 hours remove old patch.    Bone metastasis (H)       * fentaNYL 50 mcg/hr 72 hr patch    DURAGESIC    5 patch    Place 1 patch onto the skin every 72 hours remove old patch.    Bone metastasis (H)       gabapentin 300 MG capsule    NEURONTIN     Take 1 capsule by mouth See Admin Instructions Take 1 tab at HS and may increase to 2 tabs at HS for pain.        hydrochlorothiazide 25 MG tablet    HYDRODIURIL    90 tablet    Take 1 tablet (25 mg) by mouth daily    Essential hypertension with goal blood pressure less than 140/90       metoprolol succinate 25 MG 24 hr tablet    TOPROL-XL    90 tablet    Take 1 tablet (25 mg) by mouth daily DUE FOR APPOINTMENT July 2018. NO FURTHER REFILLS    Essential hypertension with goal blood pressure less than 140/90       oxyCODONE-acetaminophen 5-325 MG per tablet    PERCOCET     Take 2 tablets by mouth every 6 hours as needed        simvastatin 20 MG tablet    ZOCOR    90 tablet    TAKE 1 TABLET AT BEDTIME    Hyperlipidemia LDL goal <130       traMADol 50 MG tablet    ULTRAM     Take 1 tablet by mouth 3 times daily as needed        triamcinolone 0.1 % cream    KENALOG    60 g    Apply topically 2 times daily For leg rash    Dermatitis       * Notice:  This list has 2 medication(s) that are the same as other medications prescribed for you. Read the directions carefully, and ask your doctor or other care provider to review them with you.

## 2018-07-25 NOTE — PROGRESS NOTES
[facility] PHYSICIANS  SPECIALIZING IN BREAKTHROUGHS  Radiation Oncology    On Treatment Visit Note      Anibal Bhardwaj      Date: 2018   MRN: [id number]   : 1932  Diagnosis: Metastatic NSCLC      Reason for Visit:  On Radiation Treatment Visit     Treatment Summary to Date  Treatment Site: thoracic spine Current Dose: 1500/3000 cGy Fractions: 5/10           Subjective:   Notes that pain is still severe despite Duragesic 25.  Notes no worsening of neuropathy, but no improvement either.  Remains on Decadron TID.    Nursing ROS:   Nutrition Alteration  Diet Type: Patient's Preference  Skin  Skin Reaction: 0 - No changes        Cardiovascular  Respiratory effort: 2 - Mild dyspnea on exertion  Gastrointestinal  Nausea: 0 - None  GI Note: using stool softeners with pain meds  Genitourinary  Urinary Status: 0 - Normal     Pain Assessment  Pain Note: pain running down arms and fingers, on fentanyl and percocet      Objective:   /66  Pulse 59  Wt 105.2 kg (232 lb)  BMI 34.26 kg/m2  No change.    Toxicities:        Labs:  CBC RESULTS:   Recent Labs   Lab Test  10/14/16   1210   WBC  7.6   RBC  3.72*   HGB  12.1*   HCT  35.4*   MCV  95   MCH  32.5   MCHC  34.2   RDW  12.7   PLT  210     ELECTROLYTES:  Recent Labs   Lab Test  10/14/16   1130   NA  139   POTASSIUM  4.2   CHLORIDE  105   ALISIA  8.9   CO2  28   BUN  16   CR  1.03   GLC  111*       Assessment:    Tolerating radiation therapy well.  All questions and concerns addressed.        Plan:   1. Continue current therapy.    2. Increase Duragesic to 50.  He may need to remain on that if pain not controlled by XRT.    3. Consider weaning Decadron when he completes tx.        Mosaiq chart and setup information reviewed      Medication Review  Med list reviewed with patient?: Yes  Med Note: taking decadron tid    Educational Topic Discussed  Additional Instructions: sees Dr. Vazquez/med onc beginning of August        Renata Bustillo M.D.

## 2018-07-27 NOTE — TELEPHONE ENCOUNTER
"Requested Prescriptions   Pending Prescriptions Disp Refills     hydrochlorothiazide (HYDRODIURIL) 25 MG tablet [Pharmacy Med Name: HYDROCHLOROTHIAZIDE TAB 25MG] 90 tablet 3     Sig: TAKE 1 TABLET DAILY    Diuretics (Including Combos) Protocol Failed    7/27/2018 12:51 AM       Failed - Recent (12 mo) or future (30 days) visit within the authorizing provider's specialty    Patient had office visit in the last 12 months or has a visit in the next 30 days with authorizing provider or within the authorizing provider's specialty.  See \"Patient Info\" tab in inbasket, or \"Choose Columns\" in Meds & Orders section of the refill encounter.           Failed - Normal serum creatinine on file in past 12 months    Recent Labs   Lab Test  10/14/16   1130   CR  1.03             Failed - Normal serum potassium on file in past 12 months    Recent Labs   Lab Test  10/14/16   1130   POTASSIUM  4.2                   Failed - Normal serum sodium on file in past 12 months    Recent Labs   Lab Test  10/14/16   1130   NA  139             Passed - Blood pressure under 140/90 in past 12 months    BP Readings from Last 3 Encounters:   07/25/18 131/66   07/18/18 148/74   07/24/17 138/54                Passed - Patient is age 18 or older        hydrochlorothiazide (HYDRODIURIL) 25 MG tablet  Last Written Prescription Date:  06/28/2017  Last Fill Quantity: 90 tablet,  # refills: 3   Last office visit: 9/18/2017 with prescribing provider:  06/28/2017 ESTEPHANIA oseguera   Future Office Visit:      Kaylee PATEL (R) (M)    "

## 2018-07-31 NOTE — PROGRESS NOTES
[facility] PHYSICIANS  SPECIALIZING IN BREAKTHROUGHS  Radiation Oncology    On Treatment Visit Note      Anibal Bhardwaj      Date: 2018   MRN: [id number]   : 1932  Diagnosis: Metastatic NSCLC      Reason for Visit:  On Radiation Treatment Visit     Treatment Summary to Date  Treatment Site: thoracic spine Current Dose: 2800/3000 cGy Fractions: 9/10           Subjective:     Discomofort with swallowing/esophagitis but would prefer to not take any additional medication. No skin redness. Otherwise no complaints.    Nursing ROS:   Nutrition Alteration  Diet Type: Patient's Preference  Skin  Skin Reaction: 0 - No changes     Cardiovascular  Respiratory effort: 2 - Mild dyspnea on exertion  Gastrointestinal  Nausea: 0 - None  GI Note: using stool softeners with pain meds  Genitourinary  Urinary Status: 0 - Normal     Pain Assessment  Pain Note: pain running down arms and fingers, on fentanyl and percocet      Objective:   /80  Pulse 70  Resp 18  Wt 225 lb  SpO2 96%  BMI 33.23 kg/m2  Gen: Appears well, in no acute distress  Skin: No erythema    Labs:  CBC RESULTS:   Recent Labs   Lab Test  10/14/16   1210   WBC  7.6   RBC  3.72*   HGB  12.1*   HCT  35.4*   MCV  95   MCH  32.5   MCHC  34.2   RDW  12.7   PLT  210     ELECTROLYTES:  Recent Labs   Lab Test  10/14/16   1130   NA  139   POTASSIUM  4.2   CHLORIDE  105   ALISIA  8.9   CO2  28   BUN  16   CR  1.03   GLC  111*       Assessment:    Tolerating radiation therapy well.  All questions and concerns addressed. Discussed possible diet modifications and otc medications to help with dysphagia.      Toxicities:  Pain: Grade 1: Mild pain  Dyspnea: Grade 2: Shortness of breath with minimal exertion  Esophagitis: Grade 2: Symptomatic; altered eating/swallowing; oral supplements indicated  Dermatitis: Grade 0: No toxicity    Plan:   1. Continue current therapy.    2. Pt to contact us if he desires additional medications for  esophagitis.  3. Continue skin care         Mosaiq chart and setup information reviewed  MVCT/IGRT images checked    Medication Review  Med list reviewed with patient?: Yes  Med Note: taper given    Educational Topic Discussed  Additional Instructions: sees Dr. Vazquez/med onc Tuesday 8/7        Luigi Blanco MD    Please do not send letter to referring physician.

## 2018-07-31 NOTE — MR AVS SNAPSHOT
After Visit Summary   7/31/2018    Anibal Bhardwaj    MRN: [id number]           Patient Information     Date Of Birth          9/18/1932        Visit Information        Provider Department      7/31/2018 2:15 PM Calista Blanco MD [facility]         Follow-ups after your visit        Your next 10 appointments already scheduled     Jul 31, 2018  2:15 PM CDT   ON TREATMENT VISIT with Calista Blanco MD   [facility] ([facility]    [address], [address]  [facility] [place] [address]   [phone]            Aug 01, 2018  1:45 PM CDT   Linear Accelerator with Lr [facility]   [facility] (Critical access hospital)    [address], [address]  [facility] [place] [address]   [phone]              Who to contact     Please call your clinic at [phone] to:    Ask questions about your health    Make or cancel appointments    Discuss your medicines    Learn about your test results    Speak to your doctor            Additional Information About Your Visit        Care EveryWhere ID     This is your Care EveryWhere ID. This could be used by other organizations to access your [facility] medical records  [id number]        Your Vitals Were     Pulse Respirations Pulse Oximetry BMI (Body Mass Index)          70 18 96% 33.23 kg/m2         Blood Pressure from Last 3 Encounters:   07/31/18 151/80   07/25/18 131/66   07/18/18 148/74    Weight from Last 3 Encounters:   07/31/18 102.1 kg (225 lb)   07/25/18 105.2 kg (232 lb)   07/18/18 104.9 kg (231 lb 3.2 oz)              Today, you had the following     No orders found for display         Today's Medication Changes          These changes are accurate as of 7/31/18  2:07 PM.  If you have any questions, ask your nurse or doctor.               These medicines have changed or have updated prescriptions.        Dose/Directions    dexamethasone 4 MG tablet   Commonly known as:  DECADRON   This may have changed:   additional instructions   Used for:  Neuropathy        Dose:  4 mg   Take 1 tablet (4 mg) by mouth 3 times daily   Quantity:  60 tablet   Refills:  1                Primary Care Provider Office Phone # Fax #    Michele Mcgovern [phone] [address] [facility] [address]        Equal Access to Services     MARIA CJESSICA DANIELITO : Hadii aad ku hadasho Soomaali, waaxda luqadaha, qaybta kaalmada adeegyada, giselle mcghee eleanorn jessenia sumansonia elkins . So [facility] [phone].    ATENCIÓN: Si habla español, tiene a kaye disposición servicios gratuitos de asistencia lingüística. [facility] [phone].    We comply with applicable federal civil rights laws and [place] laws. We do not discriminate on the basis of race, color, national origin, age, disability, sex, sexual orientation, or gender identity.            Thank you!     Thank you for choosing [facility]  for your care. Our goal is always to provide you with excellent care. Hearing back from our patients is one way we can continue to improve our services. Please take a few minutes to complete the written survey that you may receive in the mail after your visit with us. Thank you!             Your Updated Medication List - Protect others around you: Learn how to safely use, store and throw away your medicines at www.disposemymeds.org.          This list is accurate as of 7/31/18  2:07 PM.  Always use your most recent med list.                   Brand Name Dispense Instructions for use Diagnosis    acetaminophen 650 MG CR tablet    TYLENOL     Take 650 mg by mouth every 6 hours as needed        amLODIPine 10 MG tablet    NORVASC    90 tablet    TAKE 1 TABLET DAILY    Essential hypertension with goal blood pressure less than 140/90       aspirin 81 MG tablet      Take 81 mg by mouth daily        cephALEXin 500 MG capsule    KEFLEX     Take 2,000 mg by mouth See Admin Instructions Take 2000 mg ( 4 capsules) prior to dental procuedures         dexamethasone 4 MG tablet    DECADRON    60 tablet    Take 1 tablet (4 mg) by mouth 3 times daily    Neuropathy       * fentaNYL 25 mcg/hr 72 hr patch    DURAGESIC    10 patch    Place 1 patch onto the skin every 72 hours remove old patch.    Bone metastasis (H)       * fentaNYL 50 mcg/hr 72 hr patch    DURAGESIC    5 patch    Place 1 patch onto the skin every 72 hours remove old patch.    Bone metastasis (H)       gabapentin 300 MG capsule    NEURONTIN     Take 1 capsule by mouth See Admin Instructions Take 1 tab at HS and may increase to 2 tabs at HS for pain.        hydrochlorothiazide 25 MG tablet    HYDRODIURIL    90 tablet    Take 1 tablet (25 mg) by mouth daily DUE FOR FASTING LABS July 2018. NO FURTHER REFILLS    Essential hypertension with goal blood pressure less than 140/90       metoprolol succinate 25 MG 24 hr tablet    TOPROL-XL    90 tablet    Take 1 tablet (25 mg) by mouth daily DUE FOR APPOINTMENT July 2018. NO FURTHER REFILLS    Essential hypertension with goal blood pressure less than 140/90       oxyCODONE-acetaminophen 5-325 MG per tablet    PERCOCET     Take 2 tablets by mouth every 6 hours as needed        simvastatin 20 MG tablet    ZOCOR    90 tablet    TAKE 1 TABLET AT BEDTIME    Hyperlipidemia LDL goal <130       traMADol 50 MG tablet    ULTRAM     Take 1 tablet by mouth 3 times daily as needed        triamcinolone 0.1 % cream    KENALOG    60 g    Apply topically 2 times daily For leg rash    Dermatitis       * Notice:  This list has 2 medication(s) that are the same as other medications prescribed for you. Read the directions carefully, and ask your doctor or other care provider to review them with you.

## 2018-07-31 NOTE — LETTER
2018      RE: Anibal Bhardwaj  [address] [facility] [address]       [facility] PHYSICIANS  SPECIALIZING IN BREAKTHROUGHS  Radiation Oncology    On Treatment Visit Note      Anibal Bhardwaj      Date: 2018   MRN: [id number]   : 1932  Diagnosis: Metastatic NSCLC      Reason for Visit:  On Radiation Treatment Visit     Treatment Summary to Date  Treatment Site: thoracic spine Current Dose: 2800/3000 cGy Fractions: 9/10           Subjective:     Discomofort with swallowing/esophagitis but would prefer to not take any additional medication. No skin redness. Otherwise no complaints.    Nursing ROS:   Nutrition Alteration  Diet Type: Patient's Preference  Skin  Skin Reaction: 0 - No changes     Cardiovascular  Respiratory effort: 2 - Mild dyspnea on exertion  Gastrointestinal  Nausea: 0 - None  GI Note: using stool softeners with pain meds  Genitourinary  Urinary Status: 0 - Normal     Pain Assessment  Pain Note: pain running down arms and fingers, on fentanyl and percocet      Objective:   /80  Pulse 70  Resp 18  Wt 225 lb  SpO2 96%  BMI 33.23 kg/m2  Gen: Appears well, in no acute distress  Skin: No erythema    Labs:  CBC RESULTS:   Recent Labs   Lab Test  10/14/16   1210   WBC  7.6   RBC  3.72*   HGB  12.1*   HCT  35.4*   MCV  95   MCH  32.5   MCHC  34.2   RDW  12.7   PLT  210     ELECTROLYTES:  Recent Labs   Lab Test  10/14/16   1130   NA  139   POTASSIUM  4.2   CHLORIDE  105   ALISIA  8.9   CO2  28   BUN  16   CR  1.03   GLC  111*       Assessment:    Tolerating radiation therapy well.  All questions and concerns addressed. Discussed possible diet modifications and otc medications to help with dysphagia.      Toxicities:  Pain: Grade 1: Mild pain  Dyspnea: Grade 2: Shortness of breath with minimal exertion  Esophagitis: Grade 2: Symptomatic; altered eating/swallowing; oral supplements indicated  Dermatitis: Grade 0: No toxicity    Plan:   1. Continue current  therapy.    2. Pt to contact us if he desires additional medications for esophagitis.  3. Continue skin care         Mosaiq chart and setup information reviewed  MVCT/IGRT images checked    Medication Review  Med list reviewed with patient?: Yes  Med Note: taper given    Educational Topic Discussed  Additional Instructions: sees Dr. Vazquez/med onc Tuesday 8/7        Luigi Blanco MD    Please do not send letter to referring physician.        Luigi Blanco MD

## 2018-07-31 NOTE — LETTER
Date:August 3, 2018      Provider requested that no letter be sent. Do not send.       [facility] Health Information

## 2018-09-07 NOTE — LETTER
9/7/2018        RE: Anibal Bhardwaj  [address] [facility] [id number]        [facility]  PRIMARY CARE PROVIDER AND CLINIC:  Michele Mcgovern [address] [facility] [address]  Chief Complaint   Patient presents with     Clinic Care Coordination - Initial     [facility] Medical Record Number:  [id number]    HPI:    Anibal Bhardwaj is a 85 year old  (9/18/1932),admitted to the [facility]  from [facility].  Hospital stay 9/2/18  through 9/5/18.  Admitted to this facility for  rehab, medical management and nursing care.  HPI information obtained from: facility chart records, facility staff, patient report, [facility] Epic chart review and family/first contact daughterEda report.  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  Recently hospitalized twice with shortness of breath generalized weakness bilateral lower extremity edema--which was not helped with diuresis.     Recent CT angio chest revealed small pleural effusion and worsening cancer burden.  Echocardiogram was normal ejection fraction no significant past valvular disease.    Patient's primary concern is severe pain.  He has pain primarily in his bones: Shoulder arms back and legs.  Currently it is not being managed with fentanyl patches 50 mcg, which was increased from 25 mcg, and morphine every 4 hours.  Patient states that short acting morphine wears off in approximately 30 minutes and pain is back up to 7 8 or 9 out of 10.   He was also started on a prednisone taper due to concerns from oncology of possible autoimmune toxicity hepatitis, myositis, or pneumonitis.  He did improve with the prednisone and is now on a  taper.    Surprisingly he denies any shortness of breath, he is not on oxygen, denies chest pain.  States his appetite is okay however it is difficult to eat while in pain.  Urinating without difficulty.  Has not had a bowel movement he is estimating in 5-7 days.  .   Is a patient of Dr. huerta but has not seen him in a long time, including since his cancer diagnosis.  Recently hospitalized at [facility] due to it being the closest emergency department.    Patient seems unclear about his prognosis he gives a history of having been diagnosed with lung cancer a couple years ago having had a resection and being declared cancer free.  PET scans every 3 months.  Most recent PET scan showed high level of activity.  This was a surprise to him.  Discharge notes from a recent hospitalization indicates that at discharge the physician did discuss with him and he had an understanding that the increasing tumor burden would still need treatment especially if he would like to make it to [place] one more time.  [place] is a special time for him he and his wife place aunt and Mrs. Marquis and have for the past 35 years.    CODE STATUS/ADVANCE DIRECTIVES DISCUSSION:   CPR/Full code   Patient's living condition: lives with spouse    ALLERGIES:Bees; Lisinopril; Amoxicillin-pot clavulanate; Augmentin; Grass; and Trees  PAST MEDICAL HISTORY:  has a past medical history of Adenocarcinoma, lung, left (H) (12/21/2016) and Type 2 diabetes mellitus with stage 2 chronic kidney disease, without long-term current use of insulin (H). He also has no past medical history of COPD (chronic obstructive pulmonary disease) (H); Difficult intubation; Gastro-oesophageal reflux disease; Malignant hyperthermia; PONV (postoperative nausea and vomiting); Sleep apnea; or Thyroid disease.  PAST SURGICAL HISTORY:  has a past surgical history that includes surgical history of - ; surgical history of -  (5/2006); surgical history of -   (11/20/2007); Colonoscopy (3/14/2012); TOTAL KNEE ARTHROPLASTY (2000); Nose surgery; Arthroscopy knee (5/31/2013); Arthroscopy knee (Left, 8/20/2014); knee surgery (9/3/2014); TOTAL KNEE ARTHROPLASTY (11/10/2014); REVISION TOTAL JOINT REPLACEMENT KNEE (April, 2015); and TOTAL HIP ARTHROPLASTY (10/'9/2015).  FAMILY HISTORY: family history includes C.A.D. in his brother; Diabetes in his sister; GASTROINTESTINAL DISEASE in his mother; HEART DISEASE in his father; Obesity in his sister. There is no history of Colon Cancer or Prostate Cancer.  SOCIAL HISTORY:  reports that he has never smoked. He has never used smokeless tobacco. He reports that he drinks alcohol. He reports that he does not use illicit drugs.    Post Discharge Medication Reconciliation Status: discharge medications reconciled, continue medications without change.  Current Outpatient Prescriptions   Medication Sig Dispense Refill     albuterol (2.5 MG/3ML) 0.083% neb solution Take 2.5 mg by nebulization every 4 hours as needed for shortness of breath / dyspnea or wheezing       cephALEXin (KEFLEX) 500 MG capsule Take 2,000 mg by mouth See Admin Instructions Take 2000 mg ( 4 capsules) prior to dental procuedures       fentaNYL (DURAGESIC) 50 mcg/hr 72 hr patch Place 1 patch onto the skin every 72 hours remove old patch. 5 patch 0     gabapentin (NEURONTIN) 300 MG capsule Take 300mg BID and 600mg at HS       GLIPIZIDE PO Take 2.5 mg by mouth every morning (before breakfast)       metoprolol succinate (TOPROL-XL) 25 MG 24 hr tablet Take 1 tablet (25 mg) by mouth daily DUE FOR APPOINTMENT July 2018. NO FURTHER REFILLS 90 tablet 3     morphine sulfate HIGH CONCENTRATE (ROXANOL) 20 mg/mL (HIGH CONC) solution Give 0.25 ml by mouth every 1 hour as  needed for Pain       OMEPRAZOLE PO Take 20 mg by mouth every morning       potassium chloride (MICRO-K) 10 MEQ CR capsule Take 10 mEq by mouth 2 times daily       predniSONE (DELTASONE) 10 MG tablet Take 80mg x1,  then 60mg x1, then 50mg x1, then 40mg x1, then 30mg x1, then 20mg x1, then 10mg x1       senna-docusate (SENOKOT-S;PERICOLACE) 8.6-50 MG per tablet Take 2 tablets by mouth 2 times daily         ROS:  10 point ROS of systems including Constitutional, Eyes, Respiratory, Cardiovascular, Gastroenterology, Genitourinary, Integumentary, Muscularskeletal, Psychiatric were all negative except for pertinent positives noted in my HPI.    Exam:  /77  Pulse 79  Temp 98.5  F (36.9  C)  Resp 18  SpO2 91%  GENERAL APPEARANCE:  Alert, in no distress, appears ill  ENT:  mucus membranes dry  RESP:  lungs clear to auscultation , no respiratory distress, diminished breath sounds right base  CV:  Palpation and auscultation of heart done , regular rate and rhythm, no murmur, rub, or gallop, 2-3+ pitting edema mild bilateral lower extremities.  Draining clear fluid.  Pedal pulses 2+ bilaterally and normal  ABDOMEN:  no guarding or rebound, bowel sounds normal  M/S:   Back is straight and nontender.  Full range of motion upper and lower extremities.  Generalized gross weakness and muscle wasting.  SKIN:  Bilateral lower extremities weeping clear fluid.  No open sores.  Cool to touch.  NEURO:   Alert and oriented.  No focal neuro deficit.  PSYCH:  oriented X 3, normal insight, judgement and memory, affect and mood normal    Lab/Diagnostic data:      GLUCOSE METER (09/05/2018 11:51 AM)  Only the most recent of 21 results within the time period is included.    GLUCOSE METER (09/05/2018 11:51 AM)   Component Value Ref Range   GLUCOSE METER 202 (H) 70 - 105 mg/dL     GLUCOSE METER (09/05/2018 11:51 AM)   Specimen Performing Laboratory   Blood [place] [address] [place] [address]     Back to top of Results      CBC WITH AUTO DIFFERENTIAL (09/05/2018 6:55 AM)  Only the most recent of 12 results within the time period is included.    CBC WITH AUTO DIFFERENTIAL (09/05/2018 6:55 AM)   Component  Value Ref Range   WHITE BLOOD COUNT 11.5 4.5 - 13.5 thou/cu mm   RED BLOOD COUNT  3.71 (L) 4.70 - 6.10 mil/cu mm   HEMOGLOBIN  11.8 (L) 14.0 - 18.0 g/dL   HEMATOCRIT  36.7 (L) 42.0 - 52.0 %   MCV  99 (H) 80 - 94 fL   MCH  31.7 (H) 27.0 - 31.0 pg   MCHC  32.1 (L) 33.0 - 36.0 g/dL   RDW  16.0 (H) 11.6 - 14.8 %   PLATELET COUNT  173 130 - 400 thou/cu mm   MPV  9.7 7.4 - 10.4 fL   NEUTROPHILS  90.7 (H) 37.0 - 80.0 %   LYMPHOCYTES  5.0 (L) 10.0 - 50.0 %   MONOCYTES  3.5 0.0 - 12.0 %   EOSINOPHILS  0.3 0.0 - 7.0 %   BASOPHILS  0.6 0.0 - 2.5 %   ABSOLUTE NEUTROPHILS  10.5 (H) 2.0 - 6.9 thou/cu mm   ABSOLUTE LYMPHOCYTES  0.6 0.6 - 3.4 thou/cu mm   ABSOLUTE MONOCYTES  0.4 0.0 - 1.0 thou/cu mm   ABSOLUTE EOSINOPHILS  0.0 <0.7 thou/cu mm   ABSOLUTE BASOPHILS  0.1 <0.2 thou/cu mm   MANUAL DIFFERENTIAL CECIL Manual Diff to follow       CBC WITH AUTO DIFFERENTIAL (09/05/2018 6:55 AM)   Specimen Performing Laboratory   Blood [place] [address] [place] [address]     Back to top of Results      MANUAL DIFFERENTIAL (09/05/2018 6:55 AM)  Only the most recent of 10 results within the time period is included.    MANUAL DIFFERENTIAL (09/05/2018 6:55 AM)   Component Value Ref Range   NEUTROPHILS RELATIVE 64.0 37.0 - 80.0 %   LYMPHOCYTES RELATIVE 6.0 (L) 10.0 - 50.0 %   MONOCYTES RELATIVE 2.0 0.0 - 12.0 %   BAND CELLS  25.0 (H) 0.0 - 1.0 %   METAMYELOCYTES  1.0 (H) <0.2 %   MYELOCYTES  2.0 (H) <0.2 %   TOTAL COUNTED 100     MANUAL NRBC  CELLS 2 /100 CELLS   ANISOCYTOSIS 1+       MANUAL DIFFERENTIAL (09/05/2018 6:55 AM)   Specimen Performing Laboratory   Blood [facility]    [address] [place] [address]         BASIC METABOLIC PANEL (09/05/2018 6:55 AM)  Only the most recent of 4 results within the time period is included.    BASIC METABOLIC PANEL (09/05/2018 6:55 AM)   Component Value Ref Range   SODIUM 140 136 - 145 mmol/L   POTASSIUM 4.0 3.5 - 5.1 mmol/L    CHLORIDE 96 (L) 98 - 107 mmol/L   CO2,TOTAL 27 22 - 31 mmol/L   ANION GAP CECIL 17 5 - 18   GLUCOSE 141 (H) 70 - 95 mg/dL   CALCIUM 9.1 8.5 - 10.1 mg/dL   BUN 38 (H) 8 - 25 mg/dL   CREATININE 0.97 0.72 - 1.25 mg/dL   GFR if African American >60 >60 ml/min/1.73m2   GFR if not African American >60 >60 ml/min/1.73m2   BUN/CREAT RATIO  39 (H) 10 - 20      BASIC METABOLIC PANEL (09/05/2018 6:55 AM)   Specimen Performing Laboratory   Blood [facility]    [address] [place] [address]         ASSESSMENT/PLAN:  Non-small cell lung cancer, unspecified laterality (H)  Bone metastasis (H)  Pleural effusion  Progressing disease.  Chemotherapy and radiation on hold due to patient's deconditioned status.  Would like to work with therapies to build strength and endurance  Continue prednisone taper as respiratory status is stable.  Not needing oxygen.  O2 sats in low 90s.  Fentanyl patch was recently increased to 50 mcg 2 days ago.  Will increase frequency of short acting morphine to every hour as needed to get a sense of how much narcotic he actually needs for pain management and then convert to some long-acting products.  Also continue with gabapentin.  Has nebulizers available however has not needed them.    Lower extremity edema  Excessive weeping in his legs.  Did not improve with diuretics.  Thought to be related to chemotherapy toxicity, multifactorial debility, venous insufficiency, protein malnourishment, and dependence.  No signs or symptoms of DVT: No exquisite pain, erythema.  Edema and drainage equal bilaterally.  Is on l oral Lasix.  May need lymphedema therapist for suggestions as many different types of dressings and wraps have been tried.    Benign essential hypertension  Stable, compensated. The current medical regimen is effective;  continue present plan and medications. Toprol-XL 25  Blood pressures  No BM in many days.09/07/2018 06:29 131/69 mmHg  09/06/2018 06:28 159/77  "mmHg  09/05/2018 19:00 131/70 mmHg  09/05/2018 15:00 151/48 mmHg    Drug-induced constipation  Nose stool softeners or bowel regimen despite high doses of narcotics.  Will start him on a bowel regimen including MiraLAX today.  Standing house orders for milk of magnesia suppository if needed and then twice daily stool softeners while on narcotics    Advanced directives, counseling/discussion  Advance Care Planning Discussion 9/7/2018. IHeidy met with Patient and Daughter Eda via telephone today at [facility]   to discuss Advance Care Planning. Anibal Bhardwaj does have decisional capacity and was present for this discussion.  Those present were informed of the voluntary nature of this discussion and wished to proceed.  The discussion included: CODE STATUS, prognosis, options, hospice information. This discussion began at 1308   and ended at 1326 for a total of 18 minutes.   At this point patient is not clearly understanding his prognosis.  Would like to remain full code.  Daughter Eda who is a nurse and has accompanied patient to oncology appointments is in better understanding of patient's prognosis and inquiring about hospice services.  They would like to take him home for 1 day this weekend and have a family discussion.  Per Eda patient's wife is very eager to take him home.  Discussed having a hospice consult with the family so they can get the information necessary to make an informed decision.  Also discussed the necessity of \"rallying the troops\" to assure that he has sufficient support at home if they choose to go home.  Patient's primary provider is a [facility] provider.  Eda requested [facility] hospice to be contacted.       Orders:  1. Give miralax today  2. Senna S 2 tabs po BID  3. Increase frequency of morphine to 0.25 ml Q 1 hr prn pain  4. [facility] consult meeting with wife and daughter  5. Okay to send morphine home with daughter for home visit 9/9.    Total time spent " with patient visit at the skilled nursing facility was Greater than 45 minutes including patient visit, review of past records and Phone call to daughter.  ACP discussion. Greater than 50% of total time spent with counseling and coordinating care due to Admission.  ACP discussion    Electronically signed by:  YAA Hobson CNP                    Sincerely,        YAA Hobson CNP

## 2018-09-07 NOTE — LETTER
9/7/2018        RE: Anibal Bhardwaj  [address] [facility] [address]        [place] [facility]  PRIMARY CARE PROVIDER AND CLINIC:  Michele Mcgovern [address] [facility] [address]  Chief Complaint   Patient presents with     Clinic Care Coordination - Initial     [facility] Medical Record Number:  [id number]    HPI:    Anibal Bhardwaj is a 85 year old  (9/18/1932),admitted to the [facility]  from [facility].  Hospital stay 9/2/18  through 9/5/18.  Admitted to this facility for  rehab, medical management and nursing care.  HPI information obtained from: facility chart records, facility staff, patient report, [facility] Epic chart review and family/first contact daughterEda report.  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  Recently hospitalized twice with shortness of breath generalized weakness bilateral lower extremity edema--which was not helped with diuresis.     Recent CT angio chest revealed small pleural effusion and worsening cancer burden.  Echocardiogram was normal ejection fraction no significant past valvular disease.    Patient's primary concern is severe pain.  He has pain primarily in his bones: Shoulder arms back and legs.  Currently it is not being managed with fentanyl patches 50 mcg, which was increased from 25 mcg, and morphine every 4 hours.  Patient states that short acting morphine wears off in approximately 30 minutes and pain is back up to 7 8 or 9 out of 10.   He was also started on a prednisone taper due to concerns from oncology of possible autoimmune toxicity hepatitis, myositis, or pneumonitis.  He did improve with the prednisone and is now on a  taper.    Surprisingly he denies any shortness of breath, he is not on oxygen, denies chest pain.  States his appetite is okay however it is difficult to eat while in pain.  Urinating without difficulty.  Has not had a bowel movement he is estimating in 5-7 days.  .   Is a patient of Dr. huerta but has not seen him in a long time, including since his cancer diagnosis.  Recently hospitalized at [facility] due to it being the closest emergency department.    Patient seems unclear about his prognosis he gives a history of having been diagnosed with lung cancer a couple years ago having had a resection and being declared cancer free.  PET scans every 3 months.  Most recent PET scan showed high level of activity.  This was a surprise to him.  Discharge notes from a recent hospitalization indicates that at discharge the physician did discuss with him and he had an understanding that the increasing tumor burden would still need treatment especially if he would like to make it to [place] one more time.  [place] is a special time for him he and his wife place aunt and Mrs. Marquis and have for the past 35 years.    CODE STATUS/ADVANCE DIRECTIVES DISCUSSION:   CPR/Full code   Patient's living condition: lives with spouse    ALLERGIES:Bees; Lisinopril; Amoxicillin-pot clavulanate; Augmentin; Grass; and Trees  PAST MEDICAL HISTORY:  has a past medical history of Adenocarcinoma, lung, left (H) (12/21/2016) and Type 2 diabetes mellitus with stage 2 chronic kidney disease, without long-term current use of insulin (H). He also has no past medical history of COPD (chronic obstructive pulmonary disease) (H); Difficult intubation; Gastro-oesophageal reflux disease; Malignant hyperthermia; PONV (postoperative nausea and vomiting); Sleep apnea; or Thyroid disease.  PAST SURGICAL HISTORY:  has a past surgical history that includes surgical history of - ; surgical history of -  (5/2006); surgical history of -   (11/20/2007); Colonoscopy (3/14/2012); TOTAL KNEE ARTHROPLASTY (2000); Nose surgery; Arthroscopy knee (5/31/2013); Arthroscopy knee (Left, 8/20/2014); knee surgery (9/3/2014); TOTAL KNEE ARTHROPLASTY (11/10/2014); REVISION TOTAL JOINT REPLACEMENT KNEE (April, 2015); and TOTAL HIP ARTHROPLASTY (10/'9/2015).  FAMILY HISTORY: family history includes C.A.D. in his brother; Diabetes in his sister; GASTROINTESTINAL DISEASE in his mother; HEART DISEASE in his father; Obesity in his sister. There is no history of Colon Cancer or Prostate Cancer.  SOCIAL HISTORY:  reports that he has never smoked. He has never used smokeless tobacco. He reports that he drinks alcohol. He reports that he does not use illicit drugs.    Post Discharge Medication Reconciliation Status: discharge medications reconciled, continue medications without change.  Current Outpatient Prescriptions   Medication Sig Dispense Refill     albuterol (2.5 MG/3ML) 0.083% neb solution Take 2.5 mg by nebulization every 4 hours as needed for shortness of breath / dyspnea or wheezing       cephALEXin (KEFLEX) 500 MG capsule Take 2,000 mg by mouth See Admin Instructions Take 2000 mg ( 4 capsules) prior to dental procuedures       gabapentin (NEURONTIN) 300 MG capsule Take 300mg BID and 600mg at HS       GLIPIZIDE PO Take 2.5 mg by mouth every morning (before breakfast)       metoprolol succinate (TOPROL-XL) 25 MG 24 hr tablet Take 1 tablet (25 mg) by mouth daily DUE FOR APPOINTMENT July 2018. NO FURTHER REFILLS 90 tablet 3     morphine sulfate HIGH CONCENTRATE (ROXANOL) 20 mg/mL (HIGH CONC) solution Give 0.5 ml by mouth every 2 hours as  needed for Pain       OMEPRAZOLE PO Take 20 mg by mouth every morning       potassium chloride (MICRO-K) 10 MEQ CR capsule Take 10 mEq by mouth 2 times daily       senna-docusate (SENOKOT-S;PERICOLACE) 8.6-50 MG per tablet Take 2 tablets by mouth 2 times daily       fentaNYL (DURAGESIC) 75 mcg/hr 72 hr patch Place 1 patch onto the  "skin every 72 hours remove old patch.       Furosemide (LASIX PO) Take 40 mg by mouth 2 times daily         ROS:  10 point ROS of systems including Constitutional, Eyes, Respiratory, Cardiovascular, Gastroenterology, Genitourinary, Integumentary, Muscularskeletal, Psychiatric were all negative except for pertinent positives noted in my HPI.    Exam:  /77  Pulse 79  Temp 98.5  F (36.9  C)  Resp 18  SpO2 91%  GENERAL APPEARANCE:  Alert, in no distress, appears ill  ENT:  mucus membranes dry  RESP:  lungs clear to auscultation , no respiratory distress, diminished breath sounds right base  CV:  Palpation and auscultation of heart done , regular rate and rhythm, no murmur, rub, or gallop, 2-3+ pitting edema mild bilateral lower extremities.  Draining clear fluid.  Pedal pulses 2+ bilaterally and normal  ABDOMEN:  no guarding or rebound, bowel sounds normal  M/S:   Back is straight and nontender.  Full range of motion upper and lower extremities.  Generalized gross weakness and muscle wasting.  SKIN:  Bilateral lower extremities weeping clear fluid.  No open sores.  Cool to touch. unstagable ulcer on coccyx approx 2 \" diameter.  Covered with slough.  Covered by dressing blocking rectum.  NEURO:   Alert and oriented.  No focal neuro deficit.  PSYCH:  oriented X 3, normal insight, judgement and memory, affect and mood normal    Lab/Diagnostic data:      GLUCOSE METER (09/05/2018 11:51 AM)  Only the most recent of 21 results within the time period is included.    GLUCOSE METER (09/05/2018 11:51 AM)   Component Value Ref Range   GLUCOSE METER 202 (H) 70 - 105 mg/dL     GLUCOSE METER (09/05/2018 11:51 AM)   Specimen Performing Laboratory   Blood [facility]    [address] [place] [address]     Back to top of Results      CBC WITH AUTO DIFFERENTIAL (09/05/2018 6:55 AM)  Only the most recent of 12 results within the time period is included.    CBC WITH AUTO DIFFERENTIAL (09/05/2018 " 6:55 AM)   Component Value Ref Range   WHITE BLOOD COUNT 11.5 4.5 - 13.5 thou/cu mm   RED BLOOD COUNT  3.71 (L) 4.70 - 6.10 mil/cu mm   HEMOGLOBIN  11.8 (L) 14.0 - 18.0 g/dL   HEMATOCRIT  36.7 (L) 42.0 - 52.0 %   MCV  99 (H) 80 - 94 fL   MCH  31.7 (H) 27.0 - 31.0 pg   MCHC  32.1 (L) 33.0 - 36.0 g/dL   RDW  16.0 (H) 11.6 - 14.8 %   PLATELET COUNT  173 130 - 400 thou/cu mm   MPV  9.7 7.4 - 10.4 fL   NEUTROPHILS  90.7 (H) 37.0 - 80.0 %   LYMPHOCYTES  5.0 (L) 10.0 - 50.0 %   MONOCYTES  3.5 0.0 - 12.0 %   EOSINOPHILS  0.3 0.0 - 7.0 %   BASOPHILS  0.6 0.0 - 2.5 %   ABSOLUTE NEUTROPHILS  10.5 (H) 2.0 - 6.9 thou/cu mm   ABSOLUTE LYMPHOCYTES  0.6 0.6 - 3.4 thou/cu mm   ABSOLUTE MONOCYTES  0.4 0.0 - 1.0 thou/cu mm   ABSOLUTE EOSINOPHILS  0.0 <0.7 thou/cu mm   ABSOLUTE BASOPHILS  0.1 <0.2 thou/cu mm   MANUAL DIFFERENTIAL CECIL Manual Diff to follow       CBC WITH AUTO DIFFERENTIAL (09/05/2018 6:55 AM)   Specimen Performing [place] [address]     Back to top of Results      MANUAL DIFFERENTIAL (09/05/2018 6:55 AM)  Only the most recent of 10 results within the time period is included.    MANUAL DIFFERENTIAL (09/05/2018 6:55 AM)   Component Value Ref Range   NEUTROPHILS RELATIVE 64.0 37.0 - 80.0 %   LYMPHOCYTES RELATIVE 6.0 (L) 10.0 - 50.0 %   MONOCYTES RELATIVE 2.0 0.0 - 12.0 %   BAND CELLS  25.0 (H) 0.0 - 1.0 %   METAMYELOCYTES  1.0 (H) <0.2 %   MYELOCYTES  2.0 (H) <0.2 %   TOTAL COUNTED 100     MANUAL NRBC  CELLS 2 /100 CELLS   ANISOCYTOSIS 1+       MANUAL DIFFERENTIAL (09/05/2018 6:55 AM)   Specimen Performing Laboratory   Blood [facility]    [address] [place] [address]         BASIC METABOLIC PANEL (09/05/2018 6:55 AM)  Only the most recent of 4 results within the time period is included.    BASIC METABOLIC PANEL (09/05/2018 6:55 AM)   Component Value Ref Range   SODIUM 140 136 - 145 mmol/L   POTASSIUM  4.0 3.5 - 5.1 mmol/L   CHLORIDE 96 (L) 98 - 107 mmol/L   CO2,TOTAL 27 22 - 31 mmol/L   ANION GAP CECIL 17 5 - 18   GLUCOSE 141 (H) 70 - 95 mg/dL   CALCIUM 9.1 8.5 - 10.1 mg/dL   BUN 38 (H) 8 - 25 mg/dL   CREATININE 0.97 0.72 - 1.25 mg/dL   GFR if African American >60 >60 ml/min/1.73m2   GFR if not African American >60 >60 ml/min/1.73m2   BUN/CREAT RATIO  39 (H) 10 - 20      BASIC METABOLIC PANEL (09/05/2018 6:55 AM)   Specimen Performing Laboratory   Blood [facility]    [address] [place] [address]         ASSESSMENT/PLAN:  Non-small cell lung cancer, unspecified laterality (H)  Bone metastasis (H)  Pleural effusion  Progressing disease.  Chemotherapy and radiation on hold due to patient's deconditioned status.  Would like to work with therapies to build strength and endurance  Continue prednisone taper as respiratory status is stable.  Not needing oxygen.  O2 sats in low 90s.  Fentanyl patch was recently increased to 50 mcg 2 days ago.  Will increase frequency of short acting morphine to every hour as needed to get a sense of how much narcotic he actually needs for pain management and then convert to some long-acting products.  Also continue with gabapentin.  Has nebulizers available however has not needed them.    Lower extremity edema  Excessive weeping in his legs.  Did not improve with diuretics.  Thought to be related to chemotherapy toxicity, multifactorial debility, venous insufficiency, protein malnourishment, and dependence.  No signs or symptoms of DVT: No exquisite pain, erythema.  Edema and drainage equal bilaterally.  Is on l oral Lasix.  May need lymphedema therapist for suggestions as many different types of dressings and wraps have been tried.    Benign essential hypertension  Stable, compensated. The current medical regimen is effective;  continue present plan and medications. Toprol-XL 25  Blood pressures  No BM in many days.09/07/2018 06:29 131/69  "mmHg  09/06/2018 06:28 159/77 mmHg  09/05/2018 19:00 131/70 mmHg  09/05/2018 15:00 151/48 mmHg    Drug-induced constipation  Nose stool softeners or bowel regimen despite high doses of narcotics.  Will start him on a bowel regimen including MiraLAX today.  Standing house orders for milk of magnesia suppository if needed and then twice daily stool softeners while on narcotics    Pressure Ulcer Coccyx  Unavoidable due to terminal illness.  Patient refuses to change position.  Most comfortable seated in recliner.  Life expectancy limited.  Expect ulcer to worsen despite interventions    Advanced directives, counseling/discussion  Advance Care Planning Discussion 9/7/2018. IHeidy met with Patient and Daughter Eda via telephone today at [facility]   to discuss Advance Care Planning. Anibal Bhardwaj does have decisional capacity and was present for this discussion.  Those present were informed of the voluntary nature of this discussion and wished to proceed.  The discussion included: CODE STATUS, prognosis, options, hospice information. This discussion began at 1308   and ended at 1326 for a total of 18 minutes.   At this point patient is not clearly understanding his prognosis.  Would like to remain full code.  Daughter Eda who is a nurse and has accompanied patient to oncology appointments is in better understanding of patient's prognosis and inquiring about hospice services.  They would like to take him home for 1 day this weekend and have a family discussion.  Per Eda patient's wife is very eager to take him home.  Discussed having a hospice consult with the family so they can get the information necessary to make an informed decision.  Also discussed the necessity of \"rallying the troops\" to assure that he has sufficient support at home if they choose to go home.  Patient's primary provider is a [place] provider.  Eda requested [place] hospice to be contacted.       Orders:  1. Give " miralax today  2. Senna S 2 tabs po BID  3. Increase frequency of morphine to 0.25 ml Q 1 hr prn pain  4. [facility] consult meeting with wife and daughter  5. Okay to send morphine home with daughter for home visit 9/9.    Total time spent with patient visit at the skilled nursing facility was Greater than 45 minutes including patient visit, review of past records and Phone call to daughter.  ACP discussion. Greater than 50% of total time spent with counseling and coordinating care due to Admission.  ACP discussion    Electronically signed by:  YAA Hobson CNP                    Sincerely,        YAA Hobson CNP

## 2018-09-10 NOTE — LETTER
"    9/10/2018        RE: Anibal Bhardwaj  [address] [facility] [address]          [facility]    Chief Complaint   Patient presents with     [facility] Acute       [facility] Medical Record Number:  [id number]    HPI:    Anibal Bhardwaj is a 85 year old  (9/18/1932), who is being seen today for an episodic care visit at [facility] .   HPI information obtained from: facility chart records, facility staff and patient report. Today's concern is:  Non-small cell lung cancer, unspecified laterality (H)  Bone metastasis (H)  Cancer associated pain  Initial plan was for patient to discharge home today with hospice services however, his home visit yesterday did not go well and family decided they will not be able to manage him at home.  Patient continues to have pain throughout body, described as bone pain. Has used approx 50cc short acting morphine each day in addition to 50mcg Fentanyl. Patient has some confusion as well.  Has seen people who are not present. Sleep disrupted by pain, states he is sleeping well between doses.      REVIEW OF SYSTEMS:  4 point ROS including Respiratory, CV, GI and , other than that noted in the HPI,  is negative    /66  Pulse 76  Temp 98.5  F (36.9  C)  Resp 16  Ht 5' 9\" (1.753 m)  Wt 230 lb 3.2 oz (104.4 kg)  SpO2 95%  BMI 33.99 kg/m2  GENERAL APPEARANCE:  Alert, in recliner chair, moving very slowly, gingerly, slow to speak. Frequent sips of water, 1 sentence, rest.  Lungs clear  L/E continue to weep clear liquid, no sores present    ASSESSMENT/PLAN:  Non-small cell lung cancer, unspecified laterality (H)  Bone metastasis (H)  Cancer associated pain  Pain management sub optimal.  Conf with hospice nurse and medical director re: converting to long term agent such as Methadone. Since fentanyl seems to be working, will stick with it, increase dose and continue with morphine. Family present.  Hospice to assess today and plan to " enroll.       Orders:  1.  Increase Fentanyl patch to 75mcg every 72hrs  2.  Increase Morphine to 0.5mL every 2hrs PRN for pain  3.  Ok to DC to LTC with  hospice      Total time spent with patient visit at the skilled nursing facility was 25 min including patient visit, review of past records and conf with family members, hospice team, Hospice medical director. Greater than 50% of total time spent with counseling and coordinating care due to hospice transition    Electronically signed by:  YAA Hobson CNP      Sincerely,        YAA Hobson CNP

## 2018-09-10 NOTE — PROGRESS NOTES
"  [facility]    Chief Complaint   Patient presents with     [facility] Acute       [facility] Medical Record Number:  [id number]    HPI:    Anibal Bhardwaj is a 85 year old  (9/18/1932), who is being seen today for an episodic care visit at [facility] .   HPI information obtained from: facility chart records, facility staff and patient report. Today's concern is:  Non-small cell lung cancer, unspecified laterality (H)  Bone metastasis (H)  Cancer associated pain  Initial plan was for patient to discharge home today with hospice services however, his home visit yesterday did not go well and family decided they will not be able to manage him at home.  Patient continues to have pain throughout body, described as bone pain. Has used approx 50cc short acting morphine each day in addition to 50mcg Fentanyl. Patient has some confusion as well.  Has seen people who are not present. Sleep disrupted by pain, states he is sleeping well between doses.      REVIEW OF SYSTEMS:  4 point ROS including Respiratory, CV, GI and , other than that noted in the HPI,  is negative    /66  Pulse 76  Temp 98.5  F (36.9  C)  Resp 16  Ht 5' 9\" (1.753 m)  Wt 230 lb 3.2 oz (104.4 kg)  SpO2 95%  BMI 33.99 kg/m2  GENERAL APPEARANCE:  Alert, in recliner chair, moving very slowly, gingerly, slow to speak. Frequent sips of water, 1 sentence, rest.  Lungs clear  L/E continue to weep clear liquid, no sores present    ASSESSMENT/PLAN:  Non-small cell lung cancer, unspecified laterality (H)  Bone metastasis (H)  Cancer associated pain  Pain management sub optimal.  Conf with hospice nurse and medical director re: converting to long term agent such as Methadone. Since fentanyl seems to be working, will stick with it, increase dose and continue with morphine. Family present.  Hospice to assess today and plan to enroll.       Orders:  1.  Increase Fentanyl patch to 75mcg every 72hrs  2.  Increase Morphine to " 0.5mL every 2hrs PRN for pain  3.  Ok to DC to LTC with  hospice      Total time spent with patient visit at the skilled nursing facility was 25 min including patient visit, review of past records and conf with family members, hospice team, Hospice medical director. Greater than 50% of total time spent with counseling and coordinating care due to hospice transition    Electronically signed by:  YAA Hobson CNP

## 2018-09-19 ENCOUNTER — MEDICAL CORRESPONDENCE (OUTPATIENT)
Dept: HEALTH INFORMATION MANAGEMENT | Facility: CLINIC | Age: 83
End: 2018-09-19

## 2019-12-22 NOTE — PROGRESS NOTES
[facility]  PRIMARY CARE PROVIDER AND CLINIC:  Michele Mcgovern [address] [place] [id number]  Chief Complaint   Patient presents with     Clinic Care Coordination - Initial     [facility] Medical Record Number:  [id number]    HPI:    Anibal Bhardwaj is a 85 year old  (9/18/1932),admitted to the [facility]  from [facility].  Hospital stay 9/2/18  through 9/5/18.  Admitted to this facility for  rehab, medical management and nursing care.  HPI information obtained from: facility chart records, facility staff, patient report, [facility] chart review and family/first contact daughterEda report.  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  Recently hospitalized twice with shortness of breath generalized weakness bilateral lower extremity edema--which was not helped with diuresis.     Recent CT angio chest revealed small pleural effusion and worsening cancer burden.  Echocardiogram was normal ejection fraction no significant past valvular disease.    Patient's primary concern is severe pain.  He has pain primarily in his bones: Shoulder arms back and legs.  Currently it is not being managed with fentanyl patches 50 mcg, which was increased from 25 mcg, and morphine every 4 hours.  Patient states that short acting morphine wears off in approximately 30 minutes and pain is back up to 7 8 or 9 out of 10.   He was also started on a prednisone taper due to concerns from oncology of possible autoimmune toxicity hepatitis, myositis, or pneumonitis.  He did improve with the prednisone and is now on a taper.    Surprisingly he denies any shortness of breath, he is not on oxygen, denies chest pain.  States  his appetite is okay however it is difficult to eat while in pain.  Urinating without difficulty.  Has not had a bowel movement he is estimating in 5-7 days.  .   Is a patient of Dr. huerta but has not seen him in a long time, including since his cancer diagnosis.  Recently hospitalized at [facility] due to it being the closest emergency department.    Patient seems unclear about his prognosis he gives a history of having been diagnosed with lung cancer a couple years ago having had a resection and being declared cancer free.  PET scans every 3 months.  Most recent PET scan showed high level of activity.  This was a surprise to him.  Discharge notes from a recent hospitalization indicates that at discharge the physician did discuss with him and he had an understanding that the increasing tumor burden would still need treatment especially if he would like to make it to [place] one more time.  [place] is a special time for him he and his wife place aunt and Mrs. Marquis and have for the past 35 years.    CODE STATUS/ADVANCE DIRECTIVES DISCUSSION:   CPR/Full code   Patient's living condition: lives with spouse    ALLERGIES:Bees; Lisinopril; Amoxicillin-pot clavulanate; Augmentin; Grass; and Trees  PAST MEDICAL HISTORY:  has a past medical history of Adenocarcinoma, lung, left (H) (12/21/2016) and Type 2 diabetes mellitus with stage 2 chronic kidney disease, without long-term current use of insulin (H). He also has no past medical history of COPD (chronic obstructive pulmonary disease) (H); Difficult intubation; Gastro-oesophageal reflux disease; Malignant hyperthermia; PONV (postoperative nausea and vomiting); Sleep apnea; or Thyroid disease.  PAST SURGICAL HISTORY:  has a past surgical history that includes surgical history of - ; surgical history of -  (5/2006); surgical history of -  (11/20/2007); Colonoscopy (3/14/2012); TOTAL KNEE ARTHROPLASTY (2000); Nose surgery; Arthroscopy knee  (5/31/2013); Arthroscopy knee (Left, 8/20/2014); knee surgery (9/3/2014); TOTAL KNEE ARTHROPLASTY (11/10/2014); REVISION TOTAL JOINT REPLACEMENT KNEE (April, 2015); and TOTAL HIP ARTHROPLASTY (10/'9/2015).  FAMILY HISTORY: family history includes C.A.D. in his brother; Diabetes in his sister; GASTROINTESTINAL DISEASE in his mother; HEART DISEASE in his father; Obesity in his sister. There is no history of Colon Cancer or Prostate Cancer.  SOCIAL HISTORY:  reports that he has never smoked. He has never used smokeless tobacco. He reports that he drinks alcohol. He reports that he does not use illicit drugs.    Post Discharge Medication Reconciliation Status: discharge medications reconciled, continue medications without change.  Current Outpatient Prescriptions   Medication Sig Dispense Refill     albuterol (2.5 MG/3ML) 0.083% neb solution Take 2.5 mg by nebulization every 4 hours as needed for shortness of breath / dyspnea or wheezing       cephALEXin (KEFLEX) 500 MG capsule Take 2,000 mg by mouth See Admin Instructions Take 2000 mg ( 4 capsules) prior to dental procuedures       gabapentin (NEURONTIN) 300 MG capsule Take 300mg BID and 600mg at HS       GLIPIZIDE PO Take 2.5 mg by mouth every morning (before breakfast)       metoprolol succinate (TOPROL-XL) 25 MG 24 hr tablet Take 1 tablet (25 mg) by mouth daily DUE FOR APPOINTMENT July 2018. NO FURTHER REFILLS 90 tablet 3     morphine sulfate HIGH CONCENTRATE (ROXANOL) 20 mg/mL (HIGH CONC) solution Give 0.5 ml by mouth every 2 hours as  needed for Pain       OMEPRAZOLE PO Take 20 mg by mouth every morning       potassium chloride (MICRO-K) 10 MEQ CR capsule Take 10 mEq by mouth 2 times daily       senna-docusate (SENOKOT-S;PERICOLACE) 8.6-50 MG per tablet Take 2 tablets by mouth 2 times daily       fentaNYL (DURAGESIC) 75 mcg/hr 72 hr patch Place 1 patch onto the skin every 72 hours remove old patch.       Furosemide (LASIX PO) Take 40 mg by mouth 2 times daily    "      ROS:  10 point ROS of systems including Constitutional, Eyes, Respiratory, Cardiovascular, Gastroenterology, Genitourinary, Integumentary, Muscularskeletal, Psychiatric were all negative except for pertinent positives noted in my HPI.    Exam:  /77  Pulse 79  Temp 98.5  F (36.9  C)  Resp 18  SpO2 91%  GENERAL APPEARANCE:  Alert, in no distress, appears ill  ENT:  mucus membranes dry  RESP:  lungs clear to auscultation , no respiratory distress, diminished breath sounds right base  CV:  Palpation and auscultation of heart done , regular rate and rhythm, no murmur, rub, or gallop, 2-3+ pitting edema mild bilateral lower extremities.  Draining clear fluid.  Pedal pulses 2+ bilaterally and normal  ABDOMEN:  no guarding or rebound, bowel sounds normal  M/S:   Back is straight and nontender.  Full range of motion upper and lower extremities.  Generalized gross weakness and muscle wasting.  SKIN:  Bilateral lower extremities weeping clear fluid.  No open sores.  Cool to touch. unstagable ulcer on coccyx approx 2 \" diameter.  Covered with slough.  Covered by dressing blocking rectum.  NEURO:   Alert and oriented.  No focal neuro deficit.  PSYCH:  oriented X 3, normal insight, judgement and memory, affect and mood normal    Lab/Diagnostic data:      GLUCOSE METER (09/05/2018 11:51 AM)  Only the most recent of 21 results within the time period is included.    GLUCOSE METER (09/05/2018 11:51 AM)   Component Value Ref Range   GLUCOSE METER 202 (H) 70 - 105 mg/dL     GLUCOSE METER (09/05/2018 11:51 AM)   Specimen Performing Laboratory   Blood [facility] [place] [address] [place] [address]     Back to top of Results      CBC WITH AUTO DIFFERENTIAL (09/05/2018 6:55 AM)  Only the most recent of 12 results within the time period is included.    CBC WITH AUTO DIFFERENTIAL (09/05/2018 6:55 AM)   Component Value Ref Range   WHITE BLOOD COUNT 11.5 4.5 - 13.5 thou/cu mm   RED BLOOD COUNT  " 3.71 (L) 4.70 - 6.10 mil/cu mm   HEMOGLOBIN  11.8 (L) 14.0 - 18.0 g/dL   HEMATOCRIT  36.7 (L) 42.0 - 52.0 %   MCV  99 (H) 80 - 94 fL   MCH  31.7 (H) 27.0 - 31.0 pg   MCHC  32.1 (L) 33.0 - 36.0 g/dL   RDW  16.0 (H) 11.6 - 14.8 %   PLATELET COUNT  173 130 - 400 thou/cu mm   MPV  9.7 7.4 - 10.4 fL   NEUTROPHILS  90.7 (H) 37.0 - 80.0 %   LYMPHOCYTES  5.0 (L) 10.0 - 50.0 %   MONOCYTES  3.5 0.0 - 12.0 %   EOSINOPHILS  0.3 0.0 - 7.0 %   BASOPHILS  0.6 0.0 - 2.5 %   ABSOLUTE NEUTROPHILS  10.5 (H) 2.0 - 6.9 thou/cu mm   ABSOLUTE LYMPHOCYTES  0.6 0.6 - 3.4 thou/cu mm   ABSOLUTE MONOCYTES  0.4 0.0 - 1.0 thou/cu mm   ABSOLUTE EOSINOPHILS  0.0 <0.7 thou/cu mm   ABSOLUTE BASOPHILS  0.1 <0.2 thou/cu mm   MANUAL DIFFERENTIAL CECIL Manual Diff to follow       CBC WITH AUTO DIFFERENTIAL (09/05/2018 6:55 AM)   Specimen Performing [place] [address]     Back to top of Results      MANUAL DIFFERENTIAL (09/05/2018 6:55 AM)  Only the most recent of 10 results within the time period is included.    MANUAL DIFFERENTIAL (09/05/2018 6:55 AM)   Component Value Ref Range   NEUTROPHILS RELATIVE 64.0 37.0 - 80.0 %   LYMPHOCYTES RELATIVE 6.0 (L) 10.0 - 50.0 %   MONOCYTES RELATIVE 2.0 0.0 - 12.0 %   BAND CELLS  25.0 (H) 0.0 - 1.0 %   METAMYELOCYTES  1.0 (H) <0.2 %   MYELOCYTES  2.0 (H) <0.2 %   TOTAL COUNTED 100     MANUAL NRBC  CELLS 2 /100 CELLS   ANISOCYTOSIS 1+       MANUAL DIFFERENTIAL (09/05/2018 6:55 AM)   Specimen Performing Laboratory   Blood [address] [address]         BASIC METABOLIC PANEL (09/05/2018 6:55 AM)  Only the most recent of 4 results within the time period is included.    BASIC METABOLIC PANEL (09/05/2018 6:55 AM)   Component Value Ref Range   SODIUM 140 136 - 145 mmol/L   POTASSIUM 4.0 3.5 - 5.1 mmol/L   CHLORIDE 96 (L) 98 - 107 mmol/L   CO2,TOTAL 27 22 - 31 mmol/L   ANION GAP CECIL 17 5  Dorcas Varela(Attending) - 18   GLUCOSE 141 (H) 70 - 95 mg/dL   CALCIUM 9.1 8.5 - 10.1 mg/dL   BUN 38 (H) 8 - 25 mg/dL   CREATININE 0.97 0.72 - 1.25 mg/dL   GFR if African American >60 >60 ml/min/1.73m2   GFR if not African American >60 >60 ml/min/1.73m2   BUN/CREAT RATIO  39 (H) 10 - 20      BASIC METABOLIC PANEL (09/05/2018 6:55 AM)   Specimen Performing Laboratory   Blood [facility]    [address] [place] [address]         ASSESSMENT/PLAN:  Non-small cell lung cancer, unspecified laterality (H)  Bone metastasis (H)  Pleural effusion  Progressing disease.  Chemotherapy and radiation on hold due to patient's deconditioned status.  Would like to work with therapies to build strength and endurance  Continue prednisone taper as respiratory status is stable.  Not needing oxygen.  O2 sats in low 90s.  Fentanyl patch was recently increased to 50 mcg 2 days ago.  Will increase frequency of short acting morphine to every hour as needed to get a sense of how much narcotic he actually needs for pain management and then convert to some long-acting products.  Also continue with gabapentin.  Has nebulizers available however has not needed them.    Lower extremity edema  Excessive weeping in his legs.  Did not improve with diuretics.  Thought to be related to chemotherapy toxicity, multifactorial debility, venous insufficiency, protein malnourishment, and dependence.  No signs or symptoms of DVT: No exquisite pain, erythema.  Edema and drainage equal bilaterally.  Is on l oral Lasix.  May need lymphedema therapist for suggestions as many different types of dressings and wraps have been tried.    Benign essential hypertension  Stable, compensated. The current medical regimen is effective;  continue present plan and medications. Toprol-XL 25  Blood pressures  No BM in many days.09/07/2018 06:29 131/69 mmHg  09/06/2018 06:28 159/77 mmHg  09/05/2018 19:00 131/70 mmHg  09/05/2018 15:00 151/48 mmHg    Drug-induced  "constipation  Nose stool softeners or bowel regimen despite high doses of narcotics.  Will start him on a bowel regimen including MiraLAX today.  Standing house orders for milk of magnesia suppository if needed and then twice daily stool softeners while on narcotics    Pressure Ulcer Coccyx  Unavoidable due to terminal illness.  Patient refuses to change position.  Most comfortable seated in recliner.  Life expectancy limited.  Expect ulcer to worsen despite interventions    Advanced directives, counseling/discussion  Advance Care Planning Discussion 9/7/2018. Heidy DUTTON met with Patient and Daughter Eda via telephone today at [facility]   to discuss Advance Care Planning. Anibal Bhardwaj does have decisional capacity and was present for this discussion.  Those present were informed of the voluntary nature of this discussion and wished to proceed.  The discussion included: CODE STATUS, prognosis, options, hospice information. This discussion began at 1308   and ended at 1326 for a total of 18 minutes.   At this point patient is not clearly understanding his prognosis.  Would like to remain full code.  Daughter Eda who is a nurse and has accompanied patient to oncology appointments is in better understanding of patient's prognosis and inquiring about hospice services.  They would like to take him home for 1 day this weekend and have a family discussion.  Per Eda patient's wife is very eager to take him home.  Discussed having a hospice consult with the family so they can get the information necessary to make an informed decision.  Also discussed the necessity of \"rallying the troops\" to assure that he has sufficient support at home if they choose to go home.  Patient's primary provider is a [facility] provider.  Eda requested [facility] hospice to be contacted.       Orders:  1. Give miralax today  2. Senna S 2 tabs po BID  3. Increase frequency of morphine to 0.25 ml Q 1 hr prn pain  4. [facility] " Hospice consult meeting with wife and daughter  5. Okay to send morphine home with daughter for home visit 9/9.    Total time spent with patient visit at the skilled nursing facility was Greater than 45 minutes including patient visit, review of past records and Phone call to daughter.  ACP discussion. Greater than 50% of total time spent with counseling and coordinating care due to Admission.  ACP discussion    Electronically signed by:  YAA Hobson CNP

## 2023-04-03 PROBLEM — C79.51 MALIGNANT NEOPLASM METASTATIC TO BONE (H): Status: ACTIVE | Noted: 2018-01-01
